# Patient Record
Sex: FEMALE | Race: WHITE | NOT HISPANIC OR LATINO | Employment: PART TIME
[De-identification: names, ages, dates, MRNs, and addresses within clinical notes are randomized per-mention and may not be internally consistent; named-entity substitution may affect disease eponyms.]

---

## 2017-06-10 ENCOUNTER — HEALTH MAINTENANCE LETTER (OUTPATIENT)
Age: 37
End: 2017-06-10

## 2018-10-10 ENCOUNTER — OFFICE VISIT (OUTPATIENT)
Dept: FAMILY MEDICINE | Facility: CLINIC | Age: 38
End: 2018-10-10
Payer: COMMERCIAL

## 2018-10-10 VITALS
HEART RATE: 94 BPM | BODY MASS INDEX: 26.12 KG/M2 | WEIGHT: 153 LBS | TEMPERATURE: 97.6 F | OXYGEN SATURATION: 99 % | DIASTOLIC BLOOD PRESSURE: 60 MMHG | HEIGHT: 64 IN | SYSTOLIC BLOOD PRESSURE: 94 MMHG

## 2018-10-10 DIAGNOSIS — J40 BRONCHITIS: Primary | ICD-10-CM

## 2018-10-10 PROCEDURE — 99213 OFFICE O/P EST LOW 20 MIN: CPT | Performed by: FAMILY MEDICINE

## 2018-10-10 RX ORDER — PREDNISONE 20 MG/1
40 TABLET ORAL DAILY
Qty: 10 TABLET | Refills: 0 | Status: SHIPPED | OUTPATIENT
Start: 2018-10-10 | End: 2019-03-13

## 2018-10-10 NOTE — PROGRESS NOTES
SUBJECTIVE:   Candelario Delatorre is a 37 year old female who presents to clinic today for the following health issues:      Acute Illness   Acute illness concerns: chest congestion   Onset: 3 weeks     Fever: no    Chills/Sweats: YES- at the start    Headache (location?): YES- little    Sinus Pressure:YES- little     Conjunctivitis:  YES- watery    Ear Pain: no    Rhinorrhea: YES- was at the start of her cold     Congestion: YES- chest     Sore Throat: no      Cough: YES-productive of green sputum, with shortness of breath    Wheeze: YES    Decreased Appetite: no    Nausea: no    Vomiting: no    Diarrhea:  no    Dysuria/Freq.: no    Fatigue/Achiness: YES    Sick/Strep Exposure: YES- work     Therapies Tried and outcome: increased water      History as above here for cough and chest congestion ongoing for about a month. Says she has had some sputum production , she endorses some chest congestion. No fevers or chills. She is a smoker but has never been diagnosed with asthma or COPD.     Problem list and histories reviewed & adjusted, as indicated.  Additional history: as documented    There is no problem list on file for this patient.    History reviewed. No pertinent surgical history.    Social History   Substance Use Topics     Smoking status: Current Every Day Smoker     Years: 13.00     Types: Cigarettes     Smokeless tobacco: Never Used      Comment: Smoking History Packs/day: 0.50     Alcohol use Yes      Comment: Alcoholic Drinks/day: rare, not while pregnant     Family History   Problem Relation Age of Onset     HEART DISEASE Paternal Grandfather      Heart Disease     Diabetes Maternal Aunt      Diabetes     Diabetes Maternal Uncle      Diabetes         Current Outpatient Prescriptions   Medication Sig Dispense Refill     predniSONE (DELTASONE) 20 MG tablet Take 2 tablets (40 mg) by mouth daily for 5 days 10 tablet 0     No Known Allergies  BP Readings from Last 3 Encounters:   10/10/18 94/60   09/12/15 112/61  "  06/01/15 112/63    Wt Readings from Last 3 Encounters:   10/10/18 153 lb (69.4 kg)   04/25/12 117 lb 8 oz (53.3 kg)                  Labs reviewed in EPIC    Reviewed and updated as needed this visit by clinical staff  Tobacco  Allergies  Meds  Med Hx  Surg Hx  Fam Hx  Soc Hx      Reviewed and updated as needed this visit by Provider         ROS:  Constitutional, HEENT, cardiovascular, pulmonary, gi and gu systems are negative, except as otherwise noted.    OBJECTIVE:     BP 94/60  Pulse 94  Temp 97.6  F (36.4  C) (Tympanic)  Ht 5' 3.75\" (1.619 m)  Wt 153 lb (69.4 kg)  LMP 10/10/2018  SpO2 99%  BMI 26.47 kg/m2  Body mass index is 26.47 kg/(m^2).  GENERAL: healthy, alert and no distress  EYES: Eyes grossly normal to inspection, PERRL and conjunctivae and sclerae normal  HENT: ear canals and TM's normal, nose and mouth without ulcers or lesions  NECK: no adenopathy, no asymmetry, masses, or scars and thyroid normal to palpation  RESP: rhonchi L upper posterior, L mid anterior and L mid posterior  CV: regular rate and rhythm, normal S1 S2, no S3 or S4, no murmur, click or rub, no peripheral edema and peripheral pulses strong  ABDOMEN: soft, nontender, no hepatosplenomegaly, no masses and bowel sounds normal  MS: no gross musculoskeletal defects noted, no edema    Diagnostic Test Results:  none     ASSESSMENT/PLAN:       (J40) Bronchitis  (primary encounter diagnosis)  Comment: Likely viral asked to call later in the week if no better.   Plan: predniSONE (DELTASONE) 20 MG tablet              FUTURE APPOINTMENTS:       - Follow-up visit as needed  Patient Instructions         Thank you for choosing [facility].  You may be receiving a survey in the mail from [employer] regarding your visit today.  Please take a few minutes to complete and return the survey to let us know how we are doing.      If you have questions or concerns, please contact us via [employer] or you can contact your care team at " [phone].    Our Clinic hours are:  Monday 6:40 am  to 7:00 pm  Tuesday -Friday 6:40 am to 5:00 pm    The [place] outpatient lab hours are:  Monday - Friday 6:10 am to 4:45 pm  Saturdays 7:00 am to 11:00 am  Appointments are required, call [phone]    If you have clinical questions after hours or would like to schedule an appointment,  call the clinic at [phone].  Viral Bronchitis (Adult)    You have a viral bronchitis. Bronchitis is inflammation and swelling of the lining of the lungs. This is often caused by an infection. Symptoms include a dry, hacking cough that is worse at night. The cough may bring up yellow-green mucus. You may also feel short of breath or wheeze. Other symptoms may include tiredness, chest discomfort, and chills.  Bronchitis that is caused by a virus is not treated with antibiotics. Instead, medicines may be given to help relieve symptoms. Symptoms can last up to 2 weeks, although the cough may last much longer.  This illness is contagious during the first few days and is spread through the air by coughing and sneezing, or by direct contact (touching the sick person and then touching your own eyes, nose, or mouth).  Most viral illnesses resolve within 10 to 14 days with rest and simple home remedies, although they may sometimes last for several weeks.  Home care    If symptoms are severe, rest at home for the first 2 to 3 days. When you go back to your usual activities, don't let yourself get too tired.    Do not smoke. Also avoid being exposed to secondhand smoke.    You may use over-the-counter medicine to control fever or pain, unless another pain medicine was prescribed. If you have chronic liver or kidney disease or have ever had a stomach ulcer or gastrointestinal bleeding, talk with your healthcare provider before using these medicines. Also talk to your provider if you are taking medicine to prevent blood clots. Aspirin should never be given to anyone younger than 18  years of age who is ill with a viral infection or fever. It may cause severe liver or brain damage.    Your appetite may be poor, so a light diet is fine. Avoid dehydration by drinking 6 to 8 glasses of fluids per day (such as water, soft drinks, sports drinks, juices, tea, or soup). Extra fluids will help loosen secretions in the nose and lungs.    Over-the-counter cough, cold, and sore-throat medicines will not shorten the length of the illness, but they may help to reduce symptoms. Don't use decongestants if you have high blood pressure.  Follow-up care  Follow up with your healthcare provider, or as advised. If you had an X-ray or ECG (electrocardiogram), a specialist will review it. You will be notified of any new findings that may affect your care.  If you are age 65 or older, or if you have a chronic lung disease or condition that affects your immune system, or you smoke, ask your healthcare provider about getting a pneumococcal vaccine and a yearly flu shot (influenza vaccine).  When to seek medical advice  Call your healthcare provider right away if any of these occur:    Fever of 100.4 F (38 C) or higher, or as directed by your healthcare provider    Coughing up increased amounts of colored sputum    Weakness, drowsiness, headache, facial pain, ear pain, or a stiff neck  Call 911  Call 911 if any of these occur:    Coughing up blood    Worsening weakness, drowsiness, headache, or stiff neck    Trouble breathing, wheezing, or pain with breathing  Date Last Reviewed: 9/13/2015 2000-2017 [employer]. [address] [place] [address]. All rights reserved. This information is not intended as a substitute for professional medical care. Always follow your healthcare professional's instructions.            Trish Stephens MD  [facility]

## 2018-10-10 NOTE — MR AVS SNAPSHOT
After Visit Summary   10/10/2018    Candelario Delatorre    MRN: [id number]           Patient Information     Date Of Birth          1980        Visit Information        Provider Department      10/10/2018 1:40 PM Trish Stephens MD [facility]        Today's Diagnoses     Bronchitis    -  1      Care Instructions          Thank you for choosing [facility].  You may be receiving a survey in the mail from [employer] regarding your visit today.  Please take a few minutes to complete and return the survey to let us know how we are doing.      If you have questions or concerns, please contact us via [email or web address] or you can contact your care team at [phone].    Our Clinic hours are:  Monday 6:40 am  to 7:00 pm  Tuesday -Friday 6:40 am to 5:00 pm    The [place] outpatient lab hours are:  Monday - Friday 6:10 am to 4:45 pm  Saturdays 7:00 am to 11:00 am  Appointments are required, call [phone]    If you have clinical questions after hours or would like to schedule an appointment,  call the clinic at [phone].  Viral Bronchitis (Adult)    You have a viral bronchitis. Bronchitis is inflammation and swelling of the lining of the lungs. This is often caused by an infection. Symptoms include a dry, hacking cough that is worse at night. The cough may bring up yellow-green mucus. You may also feel short of breath or wheeze. Other symptoms may include tiredness, chest discomfort, and chills.  Bronchitis that is caused by a virus is not treated with antibiotics. Instead, medicines may be given to help relieve symptoms. Symptoms can last up to 2 weeks, although the cough may last much longer.  This illness is contagious during the first few days and is spread through the air by coughing and sneezing, or by direct contact (touching the sick person and then touching your own eyes, nose, or mouth).  Most viral illnesses resolve within 10 to 14 days with rest and simple home  remedies, although they may sometimes last for several weeks.  Home care    If symptoms are severe, rest at home for the first 2 to 3 days. When you go back to your usual activities, don't let yourself get too tired.    Do not smoke. Also avoid being exposed to secondhand smoke.    You may use over-the-counter medicine to control fever or pain, unless another pain medicine was prescribed. If you have chronic liver or kidney disease or have ever had a stomach ulcer or gastrointestinal bleeding, talk with your healthcare provider before using these medicines. Also talk to your provider if you are taking medicine to prevent blood clots. Aspirin should never be given to anyone younger than 18 years of age who is ill with a viral infection or fever. It may cause severe liver or brain damage.    Your appetite may be poor, so a light diet is fine. Avoid dehydration by drinking 6 to 8 glasses of fluids per day (such as water, soft drinks, sports drinks, juices, tea, or soup). Extra fluids will help loosen secretions in the nose and lungs.    Over-the-counter cough, cold, and sore-throat medicines will not shorten the length of the illness, but they may help to reduce symptoms. Don't use decongestants if you have high blood pressure.  Follow-up care  Follow up with your healthcare provider, or as advised. If you had an X-ray or ECG (electrocardiogram), a specialist will review it. You will be notified of any new findings that may affect your care.  If you are age 65 or older, or if you have a chronic lung disease or condition that affects your immune system, or you smoke, ask your healthcare provider about getting a pneumococcal vaccine and a yearly flu shot (influenza vaccine).  When to seek medical advice  Call your healthcare provider right away if any of these occur:    Fever of 100.4 F (38 C) or higher, or as directed by your healthcare provider    Coughing up increased amounts of colored sputum    Weakness, drowsiness,  headache, facial pain, ear pain, or a stiff neck  Call 911  Call 911 if any of these occur:    Coughing up blood    Worsening weakness, drowsiness, headache, or stiff neck    Trouble breathing, wheezing, or pain with breathing  Date Last Reviewed: 9/13/2015 2000-2017 [employer]. [address] [place] [address]. All rights reserved. This information is not intended as a substitute for professional medical care. Always follow your healthcare professional's instructions.                Follow-ups after your visit        Follow-up notes from your care team     Return in about 14 days (around 10/24/2018), or if symptoms worsen or fail to improve, for Physical Exam.      Your next 10 appointments already scheduled     Oct 17, 2018  1:00 PM CDT   SHORT with Trish Stephens MD   [facility]    [address] [facility] [address] [address]   [phone]              Who to contact     If you have questions or need follow up information about today's clinic visit or your schedule please contact [facility] directly at [phone].  Normal or non-critical lab and imaging results will be communicated to you by MyChart, letter or phone within 4 business days after the clinic has received the results. If you do not hear from us within 7 days, please contact the clinic through MyChart or phone. If you have a critical or abnormal lab result, we will notify you by phone as soon as possible.  Submit refill requests through [employer] or call your pharmacy and they will forward the refill request to us. Please allow 3 business days for your refill to be completed.          Additional Information About Your Visit        Care EveryWhere ID     This is your Care EveryWhere ID. This could be used by other organizations to access your [place] medical records  [id number]        Your Vitals Were     Pulse Temperature Height Last Period Pulse  "Oximetry BMI (Body Mass Index)    94 97.6  F (36.4  C) (Tympanic) 5' 3.75\" (1.619 m) 10/10/2018 99% 26.47 kg/m2       Blood Pressure from Last 3 Encounters:   10/10/18 94/60   09/12/15 112/61   06/01/15 112/63    Weight from Last 3 Encounters:   10/10/18 153 lb (69.4 kg)   04/25/12 117 lb 8 oz (53.3 kg)              Today, you had the following     No orders found for display         Today's Medication Changes          These changes are accurate as of 10/10/18  1:56 PM.  If you have any questions, ask your nurse or doctor.               Start taking these medicines.        Dose/Directions    predniSONE 20 MG tablet   Commonly known as:  DELTASONE   Used for:  Bronchitis   Started by:  Trish Stephens MD        Dose:  40 mg   Take 2 tablets (40 mg) by mouth daily for 5 days   Quantity:  10 tablet   Refills:  0         Stop taking these medicines if you haven't already. Please contact your care team if you have questions.     HYDROcodone-acetaminophen 5-325 MG per tablet   Commonly known as:  NORCO   Stopped by:  Trish Stephens MD                Where to get your medicines      These medications were sent to [facility] [address] [place] [address] [facility]  [address] [facility] [address]     Phone:  [phone]     predniSONE 20 MG tablet                Primary Care Provider Office Phone # Fax #    Jus García [phone] [address] [facility] [address]        Equal Access to Services     [facility] : Hadii mariano devi hadasho Soomaali, waaxda luqadaha, qaybta kaalmada ademarissa, bria soto. So [facility] [phone].    ATENCIÓN: Si habla español, tiene a tanner disposición servicios gratuitos de asistencia lingüística. Llame al [phone].    We comply with applicable federal civil rights laws and [place] laws. We do not discriminate on the basis of race, color, national origin, age, disability, sex, sexual " orientation, or gender identity.            Thank you!     Thank you for choosing [facility]  for your care. Our goal is always to provide you with excellent care. Hearing back from our patients is one way we can continue to improve our services. Please take a few minutes to complete the written survey that you may receive in the mail after your visit with us. Thank you!             Your Updated Medication List - Protect others around you: Learn how to safely use, store and throw away your medicines at www.disposemymeds.org.          This list is accurate as of 10/10/18  1:56 PM.  Always use your most recent med list.                   Brand Name Dispense Instructions for use Diagnosis    predniSONE 20 MG tablet    DELTASONE    10 tablet    Take 2 tablets (40 mg) by mouth daily for 5 days    Bronchitis

## 2018-10-10 NOTE — PATIENT INSTRUCTIONS
Thank you for choosing [facility].  You may be receiving a survey in the mail from Waleska Garcia regarding your visit today.  Please take a few minutes to complete and return the survey to let us know how we are doing.      If you have questions or concerns, please contact us via [employer] or you can contact your care team at [phone].    Our Clinic hours are:  Monday 6:40 am  to 7:00 pm  Tuesday -Friday 6:40 am to 5:00 pm    The [place] outpatient lab hours are:  Monday - Friday 6:10 am to 4:45 pm  Saturdays 7:00 am to 11:00 am  Appointments are required, call [phone]    If you have clinical questions after hours or would like to schedule an appointment,  call the clinic at [phone].  Viral Bronchitis (Adult)    You have a viral bronchitis. Bronchitis is inflammation and swelling of the lining of the lungs. This is often caused by an infection. Symptoms include a dry, hacking cough that is worse at night. The cough may bring up yellow-green mucus. You may also feel short of breath or wheeze. Other symptoms may include tiredness, chest discomfort, and chills.  Bronchitis that is caused by a virus is not treated with antibiotics. Instead, medicines may be given to help relieve symptoms. Symptoms can last up to 2 weeks, although the cough may last much longer.  This illness is contagious during the first few days and is spread through the air by coughing and sneezing, or by direct contact (touching the sick person and then touching your own eyes, nose, or mouth).  Most viral illnesses resolve within 10 to 14 days with rest and simple home remedies, although they may sometimes last for several weeks.  Home care    If symptoms are severe, rest at home for the first 2 to 3 days. When you go back to your usual activities, don't let yourself get too tired.    Do not smoke. Also avoid being exposed to secondhand smoke.    You may use over-the-counter medicine to control fever or pain, unless another  pain medicine was prescribed. If you have chronic liver or kidney disease or have ever had a stomach ulcer or gastrointestinal bleeding, talk with your healthcare provider before using these medicines. Also talk to your provider if you are taking medicine to prevent blood clots. Aspirin should never be given to anyone younger than 18 years of age who is ill with a viral infection or fever. It may cause severe liver or brain damage.    Your appetite may be poor, so a light diet is fine. Avoid dehydration by drinking 6 to 8 glasses of fluids per day (such as water, soft drinks, sports drinks, juices, tea, or soup). Extra fluids will help loosen secretions in the nose and lungs.    Over-the-counter cough, cold, and sore-throat medicines will not shorten the length of the illness, but they may help to reduce symptoms. Don't use decongestants if you have high blood pressure.  Follow-up care  Follow up with your healthcare provider, or as advised. If you had an X-ray or ECG (electrocardiogram), a specialist will review it. You will be notified of any new findings that may affect your care.  If you are age 65 or older, or if you have a chronic lung disease or condition that affects your immune system, or you smoke, ask your healthcare provider about getting a pneumococcal vaccine and a yearly flu shot (influenza vaccine).  When to seek medical advice  Call your healthcare provider right away if any of these occur:    Fever of 100.4 F (38 C) or higher, or as directed by your healthcare provider    Coughing up increased amounts of colored sputum    Weakness, drowsiness, headache, facial pain, ear pain, or a stiff neck  Call 911  Call 911 if any of these occur:    Coughing up blood    Worsening weakness, drowsiness, headache, or stiff neck    Trouble breathing, wheezing, or pain with breathing  Date Last Reviewed: 9/13/2015 2000-2017 [employer]. [address] [place] [address], [place] [address]. All rights reserved.  This information is not intended as a substitute for professional medical care. Always follow your healthcare professional's instructions.

## 2019-01-31 ENCOUNTER — OFFICE VISIT (OUTPATIENT)
Dept: DERMATOLOGY | Facility: CLINIC | Age: 39
End: 2019-01-31
Payer: COMMERCIAL

## 2019-01-31 VITALS — SYSTOLIC BLOOD PRESSURE: 121 MMHG | OXYGEN SATURATION: 100 % | DIASTOLIC BLOOD PRESSURE: 86 MMHG | HEART RATE: 104 BPM

## 2019-01-31 DIAGNOSIS — L70.0 ACNE VULGARIS: Primary | ICD-10-CM

## 2019-01-31 DIAGNOSIS — Z51.81 THERAPEUTIC DRUG MONITORING: ICD-10-CM

## 2019-01-31 DIAGNOSIS — L72.0 EPIDERMAL CYST: ICD-10-CM

## 2019-01-31 LAB
ANION GAP SERPL CALCULATED.3IONS-SCNC: 7 MMOL/L (ref 3–14)
BUN SERPL-MCNC: 11 MG/DL (ref 7–30)
CALCIUM SERPL-MCNC: 8.6 MG/DL (ref 8.5–10.1)
CHLORIDE SERPL-SCNC: 104 MMOL/L (ref 94–109)
CO2 SERPL-SCNC: 27 MMOL/L (ref 20–32)
CREAT SERPL-MCNC: 0.65 MG/DL (ref 0.52–1.04)
GFR SERPL CREATININE-BSD FRML MDRD: >90 ML/MIN/{1.73_M2}
GLUCOSE SERPL-MCNC: 100 MG/DL (ref 70–99)
POTASSIUM SERPL-SCNC: 4.2 MMOL/L (ref 3.4–5.3)
SODIUM SERPL-SCNC: 138 MMOL/L (ref 133–144)

## 2019-01-31 PROCEDURE — 99214 OFFICE O/P EST MOD 30 MIN: CPT | Performed by: PHYSICIAN ASSISTANT

## 2019-01-31 PROCEDURE — 36415 COLL VENOUS BLD VENIPUNCTURE: CPT | Performed by: PHYSICIAN ASSISTANT

## 2019-01-31 PROCEDURE — 80048 BASIC METABOLIC PNL TOTAL CA: CPT | Performed by: PHYSICIAN ASSISTANT

## 2019-01-31 RX ORDER — TRETINOIN 0.5 MG/G
CREAM TOPICAL
Qty: 45 G | Refills: 4 | Status: SHIPPED | OUTPATIENT
Start: 2019-01-31 | End: 2021-04-21

## 2019-01-31 RX ORDER — SPIRONOLACTONE 100 MG/1
100 TABLET, FILM COATED ORAL DAILY
Qty: 90 TABLET | Refills: 3 | Status: SHIPPED | OUTPATIENT
Start: 2019-01-31 | End: 2019-05-15

## 2019-01-31 NOTE — PROGRESS NOTES
Candelario Delatorre is a 38 year old year old female patient here today for spot on forehead.  Patient states this has been present for 2 years.  Patient reports the following symptoms:  Will occasionally drain and fluctuate in size.  Patient reports the following previous treatments none. She also notes adult female acne, has tried bpo and salicylic acid otc.  Patient reports the following modifying factors none.  Associated symptoms: none.  Patient has no other skin complaints today.  Remainder of the HPI, Meds, PMH, Allergies, FH, and SH was reviewed in chart.    Pertinent Hx:  Acne Vulgaris   History reviewed. No pertinent past medical history.    History reviewed. No pertinent surgical history.     Family History   Problem Relation Age of Onset     Heart Disease Paternal Grandfather         Heart Disease     Diabetes Maternal Aunt         Diabetes     Diabetes Maternal Uncle         Diabetes     Melanoma No family hx of        Social History     Socioeconomic History     Marital status:      Spouse name: Jonathan Guillen     Number of children: Not on file     Years of education: Not on file     Highest education level: Not on file   Social Needs     Financial resource strain: Not on file     Food insecurity - worry: Not on file     Food insecurity - inability: Not on file     Transportation needs - medical: Not on file     Transportation needs - non-medical: Not on file   Occupational History     Not on file   Tobacco Use     Smoking status: Current Every Day Smoker     Years: 13.00     Types: Cigarettes     Smokeless tobacco: Never Used     Tobacco comment: Smoking History Packs/day: 0.50   Substance and Sexual Activity     Alcohol use: Yes     Comment: Alcoholic Drinks/day: rare, not while pregnant     Drug use: Unknown     Types: Other     Comment: Drug use: No     Sexual activity: Yes     Partners: Male   Other Topics Concern     Parent/sibling w/ CABG, MI or angioplasty before 65F 55M? Not Asked   Social  History Narrative    p 2013.       Outpatient Encounter Medications as of 2019   Medication Sig Dispense Refill     spironolactone (ALDACTONE) 100 MG tablet Take 1 tablet (100 mg) by mouth daily 90 tablet 3     tretinoin (RETIN-A) 0.05 % external cream Apply pea sized amount at bedtime to face. 45 g 4     [] predniSONE (DELTASONE) 20 MG tablet Take 2 tablets (40 mg) by mouth daily for 5 days 10 tablet 0     No facility-administered encounter medications on file as of 2019.              Review Of Systems  Skin: As above  Eyes: negative  Ears/Nose/Throat: negative  Respiratory: No shortness of breath, dyspnea on exertion, cough, or hemoptysis  Cardiovascular: negative  Gastrointestinal: negative  Genitourinary: negative  Musculoskeletal: negative  Neurologic: negative  Psychiatric: negative  Hematologic/Lymphatic/Immunologic: negative  Endocrine: negative      O:   NAD, WDWN, Alert & Oriented, Mood & Affect wnl, Vitals stable   Here today alone   /86 (BP Location: Right arm, Patient Position: Sitting, Cuff Size: Adult Large)   Pulse 104   SpO2 100%    General appearance normal   Vitals stable   Alert, oriented and in no acute distress     Small subcutaneous nodule with central punctum on left forehead  Few inflammatory papules on lower half of face and neck     Eyes: Conjunctivae/lids:Normal     ENT: Lips: normal    MSK:Normal    Pulm: Breathing Normal    Neuro/Psych: Orientation:Normal; Mood/Affect:Normal  A/P:  1. Epidermal cyst on left forehead  Discussed excision, will schedule with Dr. Salazar.   2. Acne Vulgaris   Discussed spironolactone with patient, is not sexually active.   Is aware of risk of birth defects, increased potassium, and decreased bp.   Start Spironolactone 100 mg daily.   Check bMP today.   Start tretinoin at bedtime.   Use daily sunscreen and moisturizers.   Recheck in 3 months.

## 2019-01-31 NOTE — LETTER
1/31/2019         RE: Candelario Delatorre  [address]  [place] [address]        Dear Colleague,    Thank you for referring your patient, Candelario Delatorre, to the [facility]. Please see a copy of my visit note below.    Candelario Delatorre is a 38 year old year old female patient here today for spot on forehead.  Patient states this has been present for 2 years.  Patient reports the following symptoms:  Will occasionally drain and fluctuate in size.  Patient reports the following previous treatments none. She also notes adult female acne, has tried bpo and salicylic acid otc.  Patient reports the following modifying factors none.  Associated symptoms: none.  Patient has no other skin complaints today.  Remainder of the HPI, Meds, PMH, Allergies, FH, and SH was reviewed in chart.    Pertinent Hx:  Acne Vulgaris   History reviewed. No pertinent past medical history.    History reviewed. No pertinent surgical history.     Family History   Problem Relation Age of Onset     Heart Disease Paternal Grandfather         Heart Disease     Diabetes Maternal Aunt         Diabetes     Diabetes Maternal Uncle         Diabetes     Melanoma No family hx of        Social History     Socioeconomic History     Marital status:      Spouse name: Jonathan Guillen     Number of children: Not on file     Years of education: Not on file     Highest education level: Not on file   Social Needs     Financial resource strain: Not on file     Food insecurity - worry: Not on file     Food insecurity - inability: Not on file     Transportation needs - medical: Not on file     Transportation needs - non-medical: Not on file   Occupational History     Not on file   Tobacco Use     Smoking status: Current Every Day Smoker     Years: 13.00     Types: Cigarettes     Smokeless tobacco: Never Used     Tobacco comment: Smoking History Packs/day: 0.50   Substance and Sexual Activity     Alcohol use: Yes     Comment:  Alcoholic Drinks/day: rare, not while pregnant     Drug use: Unknown     Types: Other     Comment: Drug use: No     Sexual activity: Yes     Partners: Male   Other Topics Concern     Parent/sibling w/ CABG, MI or angioplasty before 65F 55M? Not Asked   Social History Narrative    p 2013.       Outpatient Encounter Medications as of 2019   Medication Sig Dispense Refill     spironolactone (ALDACTONE) 100 MG tablet Take 1 tablet (100 mg) by mouth daily 90 tablet 3     tretinoin (RETIN-A) 0.05 % external cream Apply pea sized amount at bedtime to face. 45 g 4     [] predniSONE (DELTASONE) 20 MG tablet Take 2 tablets (40 mg) by mouth daily for 5 days 10 tablet 0     No facility-administered encounter medications on file as of 2019.              Review Of Systems  Skin: As above  Eyes: negative  Ears/Nose/Throat: negative  Respiratory: No shortness of breath, dyspnea on exertion, cough, or hemoptysis  Cardiovascular: negative  Gastrointestinal: negative  Genitourinary: negative  Musculoskeletal: negative  Neurologic: negative  Psychiatric: negative  Hematologic/Lymphatic/Immunologic: negative  Endocrine: negative      O:   NAD, WDWN, Alert & Oriented, Mood & Affect wnl, Vitals stable   Here today alone   /86 (BP Location: Right arm, Patient Position: Sitting, Cuff Size: Adult Large)   Pulse 104   SpO2 100%    General appearance normal   Vitals stable   Alert, oriented and in no acute distress     Small subcutaneous nodule with central punctum on left forehead  Few inflammatory papules on lower half of face and neck     Eyes: Conjunctivae/lids:Normal     ENT: Lips: normal    MSK:Normal    Pulm: Breathing Normal    Neuro/Psych: Orientation:Normal; Mood/Affect:Normal  A/P:  1. Epidermal cyst on left forehead  Discussed excision, will schedule with Dr. Salazar.   2. Acne Vulgaris   Discussed spironolactone with patient, is not sexually active.   Is aware of risk of birth defects, increased  potassium, and decreased bp.   Start Spironolactone 100 mg daily.   Check bMP today.   Start tretinoin at bedtime.   Use daily sunscreen and moisturizers.   Recheck in 3 months.       Again, thank you for allowing me to participate in the care of your patient.        Sincerely,        Torri Mcdonald PA-C

## 2019-01-31 NOTE — NURSING NOTE
"Initial /86 (BP Location: Right arm, Patient Position: Sitting, Cuff Size: Adult Large)   Pulse 104   SpO2 100%  Estimated body mass index is 26.47 kg/m  as calculated from the following:    Height as of 10/10/18: 1.619 m (5' 3.75\").    Weight as of 10/10/18: 69.4 kg (153 lb). .      "

## 2019-02-12 ENCOUNTER — TELEPHONE (OUTPATIENT)
Dept: FAMILY MEDICINE | Facility: CLINIC | Age: 39
End: 2019-02-12

## 2019-02-12 NOTE — TELEPHONE ENCOUNTER
Panel Management Review      Patient has the following on her problem list:       Composite cancer screening  Chart review shows that this patient is due/due soon for the following Pap Smear  Summary:    Patient is due/failing the following:   PAP    Action needed:   Patient needs office visit for pap.    Type of outreach:    Phone, spoke to patient.  Patient had other appt but will get around to making appt for pap    Questions for provider review:    None                                                                                                                                    Stephany Lorenz CMA     Chart routed to  .

## 2019-03-13 ENCOUNTER — OFFICE VISIT (OUTPATIENT)
Dept: DERMATOLOGY | Facility: CLINIC | Age: 39
End: 2019-03-13
Payer: COMMERCIAL

## 2019-03-13 VITALS — OXYGEN SATURATION: 98 % | HEART RATE: 86 BPM | SYSTOLIC BLOOD PRESSURE: 108 MMHG | DIASTOLIC BLOOD PRESSURE: 72 MMHG

## 2019-03-13 DIAGNOSIS — L72.0 EPIDERMAL CYST: Primary | ICD-10-CM

## 2019-03-13 PROCEDURE — 11442 EXC FACE-MM B9+MARG 1.1-2 CM: CPT | Mod: 51 | Performed by: DERMATOLOGY

## 2019-03-13 PROCEDURE — 88331 PATH CONSLTJ SURG 1 BLK 1SPC: CPT | Performed by: DERMATOLOGY

## 2019-03-13 PROCEDURE — 13131 CMPLX RPR F/C/C/M/N/AX/G/H/F: CPT | Performed by: DERMATOLOGY

## 2019-03-13 NOTE — PATIENT INSTRUCTIONS
Sutured Wound Care     [facility]: [phone]    [facility]: [phone]    forehead      ? No strenuous activity for 48 hours. Resume moderate activity in 48 hours. No heavy exercising until you are seen for follow up in one week.     ? Take Tylenol as needed for discomfort.                         ? Do not drink alcoholic beverages for 48 hours.     ? Keep the pressure bandage in place for 24 hours. If the bandage becomes blood tinged or loose, reinforce it with gauze and tape.        (Refer to the reverse side of this page for management of bleeding).    ? Remove pressure bandage in 24 hours     ? Leave the flat bandage in place until your follow up appointment.    ? Keep the bandage dry. Wash around it carefully.    ? If the tape becomes soiled or starts to come off, reinforce it with additional paper tape.    ? Do not smoke for 3 weeks; smoking is detrimental to wound healing.    ? It is normal to have swelling and bruising around the surgical site. The bruising will fade in approximately 10-14 days. Elevate the area to reduce swelling.    ? Numbness, itchiness and sensitivity to temperature changes can occur after surgery and may take up to 18 months to normalize.      POSSIBLE COMPLICATIONS    BLEEDIN. Leave the bandage in place.  2. Use tightly rolled up gauze or a cloth to apply direct pressure over the bandage for 20   minutes.  3. Reapply pressure for an additional 20 minutes if necessary  4. Call the office or go to the nearest emergency room if pressure fails to stop the bleeding.  5. Use additional gauze and tape to maintain pressure once the bleeding has stopped.        PAIN:    1. Post operative pain should slowly get better, never worse.  2. A severe increase in pain may indicate a problem. Call the office if this occurs.    In case of emergency phone:Dr Salazar [phone]

## 2019-03-13 NOTE — PROGRESS NOTES
Candelario Delatorre is a 38 year old year old female patient here today for evaluation and managment of tender draining cyst on forehead. Patient reports the following modifying factors none.  Associated symptoms: none.  Patient has no other skin complaints today.  Remainder of the HPI, Meds, PMH, Allergies, FH, and SH was reviewed in chart.    History reviewed. No pertinent past medical history.    History reviewed. No pertinent surgical history.     Family History   Problem Relation Age of Onset     Heart Disease Paternal Grandfather         Heart Disease     Diabetes Maternal Aunt         Diabetes     Diabetes Maternal Uncle         Diabetes     Melanoma No family hx of        Social History     Socioeconomic History     Marital status:      Spouse name: Jonathan Guillen     Number of children: Not on file     Years of education: Not on file     Highest education level: Not on file   Occupational History     Not on file   Social Needs     Financial resource strain: Not on file     Food insecurity:     Worry: Not on file     Inability: Not on file     Transportation needs:     Medical: Not on file     Non-medical: Not on file   Tobacco Use     Smoking status: Current Every Day Smoker     Years: 13.00     Types: Cigarettes     Smokeless tobacco: Never Used     Tobacco comment: Smoking History Packs/day: 0.50   Substance and Sexual Activity     Alcohol use: Yes     Comment: Alcoholic Drinks/day: rare, not while pregnant     Drug use: Unknown     Types: Other     Comment: Drug use: No     Sexual activity: Yes     Partners: Male   Lifestyle     Physical activity:     Days per week: Not on file     Minutes per session: Not on file     Stress: Not on file   Relationships     Social connections:     Talks on phone: Not on file     Gets together: Not on file     Attends Pent[employer]al service: Not on file     Active member of club or organization: Not on file     Attends meetings of clubs or organizations: Not on file      Relationship status: Not on file     Intimate partner violence:     Fear of current or ex partner: Not on file     Emotionally abused: Not on file     Physically abused: Not on file     Forced sexual activity: Not on file   Other Topics Concern     Parent/sibling w/ CABG, MI or angioplasty before 65F 55M? Not Asked   Social History Narrative    p 7/18/2013.       Outpatient Encounter Medications as of 3/13/2019   Medication Sig Dispense Refill     spironolactone (ALDACTONE) 100 MG tablet Take 1 tablet (100 mg) by mouth daily 90 tablet 3     tretinoin (RETIN-A) 0.05 % external cream Apply pea sized amount at bedtime to face. 45 g 4     [DISCONTINUED] predniSONE (DELTASONE) 20 MG tablet Take 2 tablets (40 mg) by mouth daily for 5 days 10 tablet 0     No facility-administered encounter medications on file as of 3/13/2019.              Review Of Systems  Skin: As above  Eyes: negative  Ears/Nose/Throat: negative  Respiratory: No shortness of breath, dyspnea on exertion, cough, or hemoptysis  Cardiovascular: negative  Gastrointestinal: negative  Genitourinary: negative  Musculoskeletal: negative  Neurologic: negative  Psychiatric: negative  Hematologic/Lymphatic/Immunologic: negative  Endocrine: negative      O:   NAD, WDWN, Alert & Oriented, Mood & Affect wnl, Vitals stable   Here today alone   /72   Pulse 86   SpO2 98%    General appearance normal   Vitals stable   Alert, oriented and in no acute distress     L sup forehead 1.3cm nodule with comedone  Eyes: Conjunctivae/lids:Normal     ENT: Lips, buccal mucosa, tongue: normal    MSK:Normal    Cardiovascular: peripheral edema none    Pulm: Breathing Normal    Neuro/Psych: Orientation:Normal; Mood/Affect:Normal      MICRO:   L sup forehead: Normal epidermis with cyst lined by stratified squamous epithelium with epidermal keratinization   A/P:  1. L sup forehead 1.3cm eic  BENIGN LESIONS DISCUSSED WITH PATIENT:  I discussed the specifics of tumor, prognosis, and  genetics of benign lesions.  I explained that treatment of these lesions would be purely cosmetic and not medically neccessary.  I discussed with patient different removal options including excision, cautery and /or laser.    EXCISION OF CYST AND COMPLEX: [facility], consent obtained, anesthesia and prep, the margins of the cyst were identified and an elliptical incision was made encompassing the cyst. The incisions were made through the skin and down to and including the subcutaneous tissue. The cyst was removed en bloc and submitted for frozen pathologic review. The wound edges were widely undermined until adequate tissue mobility was obtained. hemostasis was achieved. The wound edges were then closed in a layered fashion, being careful not to leave any dead space. Postoperative length was 2 cm.   EBL minimal; complications none; wound care routine. The patient was discharged in good condition and will return in one week for wound evaluation.

## 2019-03-13 NOTE — NURSING NOTE
Chief Complaint   Patient presents with     Derm Problem     cyst removal       Vitals:    03/13/19 0843   BP: 108/72   Pulse: 86   SpO2: 98%     Wt Readings from Last 1 Encounters:   10/10/18 69.4 kg (153 lb)       Rebecca Montano LPN.................3/13/2019

## 2019-03-13 NOTE — LETTER
3/13/2019         RE: Candelario Delatorre  [address]  [place] [address]        Dear Colleague,    Thank you for referring your patient, Candelario Delatorre, to the [facility]. Please see a copy of my visit note below.    Candelario Delatorre is a 38 year old year old female patient here today for evaluation and managment of tender draining cyst on forehead. Patient reports the following modifying factors none.  Associated symptoms: none.  Patient has no other skin complaints today.  Remainder of the HPI, Meds, PMH, Allergies, FH, and SH was reviewed in chart.    History reviewed. No pertinent past medical history.    History reviewed. No pertinent surgical history.     Family History   Problem Relation Age of Onset     Heart Disease Paternal Grandfather         Heart Disease     Diabetes Maternal Aunt         Diabetes     Diabetes Maternal Uncle         Diabetes     Melanoma No family hx of        Social History     Socioeconomic History     Marital status:      Spouse name: Jonathan Guillen     Number of children: Not on file     Years of education: Not on file     Highest education level: Not on file   Occupational History     Not on file   Social Needs     Financial resource strain: Not on file     Food insecurity:     Worry: Not on file     Inability: Not on file     Transportation needs:     Medical: Not on file     Non-medical: Not on file   Tobacco Use     Smoking status: Current Every Day Smoker     Years: 13.00     Types: Cigarettes     Smokeless tobacco: Never Used     Tobacco comment: Smoking History Packs/day: 0.50   Substance and Sexual Activity     Alcohol use: Yes     Comment: Alcoholic Drinks/day: rare, not while pregnant     Drug use: Unknown     Types: Other     Comment: Drug use: No     Sexual activity: Yes     Partners: Male   Lifestyle     Physical activity:     Days per week: Not on file     Minutes per session: Not on file     Stress: Not on file    Relationships     Social connections:     Talks on phone: Not on file     Gets together: Not on file     Attends Jainism service: Not on file     Active member of club or organization: Not on file     Attends meetings of clubs or organizations: Not on file     Relationship status: Not on file     Intimate partner violence:     Fear of current or ex partner: Not on file     Emotionally abused: Not on file     Physically abused: Not on file     Forced sexual activity: Not on file   Other Topics Concern     Parent/sibling w/ CABG, MI or angioplasty before 65F 55M? Not Asked   Social History Narrative    p 7/18/2013.       Outpatient Encounter Medications as of 3/13/2019   Medication Sig Dispense Refill     spironolactone (ALDACTONE) 100 MG tablet Take 1 tablet (100 mg) by mouth daily 90 tablet 3     tretinoin (RETIN-A) 0.05 % external cream Apply pea sized amount at bedtime to face. 45 g 4     [DISCONTINUED] predniSONE (DELTASONE) 20 MG tablet Take 2 tablets (40 mg) by mouth daily for 5 days 10 tablet 0     No facility-administered encounter medications on file as of 3/13/2019.              Review Of Systems  Skin: As above  Eyes: negative  Ears/Nose/Throat: negative  Respiratory: No shortness of breath, dyspnea on exertion, cough, or hemoptysis  Cardiovascular: negative  Gastrointestinal: negative  Genitourinary: negative  Musculoskeletal: negative  Neurologic: negative  Psychiatric: negative  Hematologic/Lymphatic/Immunologic: negative  Endocrine: negative      O:   NAD, WDWN, Alert & Oriented, Mood & Affect wnl, Vitals stable   Here today alone   /72   Pulse 86   SpO2 98%    General appearance normal   Vitals stable   Alert, oriented and in no acute distress     L sup forehead 1.3cm nodule with comedone  Eyes: Conjunctivae/lids:Normal     ENT: Lips, buccal mucosa, tongue: normal    MSK:Normal    Cardiovascular: peripheral edema none    Pulm: Breathing Normal    Neuro/Psych: Orientation:Normal;  Mood/Affect:Normal      MICRO:   L sup forehead: Normal epidermis with cyst lined by stratified squamous epithelium with epidermal keratinization   A/P:  1. L sup forehead 1.3cm eic  BENIGN LESIONS DISCUSSED WITH PATIENT:  I discussed the specifics of tumor, prognosis, and genetics of benign lesions.  I explained that treatment of these lesions would be purely cosmetic and not medically neccessary.  I discussed with patient different removal options including excision, cautery and /or laser.    EXCISION OF CYST AND COMPLEX: After thorough discussion of [facility], consent obtained, anesthesia and prep, the margins of the cyst were identified and an elliptical incision was made encompassing the cyst. The incisions were made through the skin and down to and including the subcutaneous tissue. The cyst was removed en bloc and submitted for frozen pathologic review. The wound edges were widely undermined until adequate tissue mobility was obtained. hemostasis was achieved. The wound edges were then closed in a layered fashion, being careful not to leave any dead space. Postoperative length was 2 cm.   EBL minimal; complications none; wound care routine. The patient was discharged in good condition and will return in one week for wound evaluation.      Again, thank you for allowing me to participate in the care of your patient.        Sincerely,        Uche Salazar MD

## 2019-03-18 ENCOUNTER — TELEPHONE (OUTPATIENT)
Dept: DERMATOLOGY | Facility: CLINIC | Age: 39
End: 2019-03-18

## 2019-03-18 NOTE — TELEPHONE ENCOUNTER
Reason for Call:  Other     Detailed comments: Pt had lesion removed from forehead on 03/13 - Would like to know results of pathology.     Phone Number Patient can be reached at: Home number on file [phone] (home)    Best Time: Any    Can we leave a detailed message on this number? YES    Call taken on 3/18/2019 at 11:46 AM by Denise Behrendt

## 2019-03-18 NOTE — TELEPHONE ENCOUNTER
Epidermal cyst                                  I don't see a send out biopsy in chart, looks like path was sent out per dictation though?...    Please advise. Teresa Javed RN

## 2019-03-18 NOTE — TELEPHONE ENCOUNTER
Message left to return call. Per note addended by Dr. Salazar below, diagnosis is Epidermal cyst.     Teresa Javed RN

## 2019-03-20 ENCOUNTER — ALLIED HEALTH/NURSE VISIT (OUTPATIENT)
Dept: DERMATOLOGY | Facility: CLINIC | Age: 39
End: 2019-03-20
Payer: COMMERCIAL

## 2019-03-20 DIAGNOSIS — Z48.01 CHANGE OR REMOVAL OF SURGICAL WOUND DRESSING: Primary | ICD-10-CM

## 2019-03-20 PROCEDURE — 99207 ZZC NO CHARGE NURSE ONLY: CPT

## 2019-03-20 NOTE — PATIENT INSTRUCTIONS

## 2019-03-20 NOTE — PROGRESS NOTES
Patient returned to clinic for post surgery 1 week follow up bandage change to forehead. Patient has no complaints, denies pain. Bandage removed, area cleansed with normal saline. Site is healing and wound edges approximating well. Reapplied new steri strips and paper tape. Advised to watch for signs/symptoms of infection; spreading redness,drainage, odor, fever. Call or report promptly to clinic. Patient given written instructions and informed to return to clinic as needed. Patient verbalized understanding.     Checo VELA RN   Specialty Clinics

## 2019-04-04 ENCOUNTER — OFFICE VISIT (OUTPATIENT)
Dept: FAMILY MEDICINE | Facility: CLINIC | Age: 39
End: 2019-04-04
Payer: COMMERCIAL

## 2019-04-04 VITALS
RESPIRATION RATE: 14 BRPM | HEART RATE: 80 BPM | SYSTOLIC BLOOD PRESSURE: 110 MMHG | BODY MASS INDEX: 28.53 KG/M2 | OXYGEN SATURATION: 98 % | HEIGHT: 63 IN | TEMPERATURE: 98.6 F | DIASTOLIC BLOOD PRESSURE: 62 MMHG | WEIGHT: 161 LBS

## 2019-04-04 DIAGNOSIS — Z20.2 EXPOSURE TO STD: ICD-10-CM

## 2019-04-04 DIAGNOSIS — Z11.3 SCREENING FOR STD (SEXUALLY TRANSMITTED DISEASE): ICD-10-CM

## 2019-04-04 DIAGNOSIS — Z00.00 ENCOUNTER FOR ROUTINE ADULT HEALTH EXAMINATION WITHOUT ABNORMAL FINDINGS: Primary | ICD-10-CM

## 2019-04-04 LAB
HBV SURFACE AB SERPL IA-ACNC: 0.34 M[IU]/ML
HIV 1+2 AB+HIV1 P24 AG SERPL QL IA: NONREACTIVE

## 2019-04-04 PROCEDURE — G0476 HPV COMBO ASSAY CA SCREEN: HCPCS | Performed by: FAMILY MEDICINE

## 2019-04-04 PROCEDURE — G0145 SCR C/V CYTO,THINLAYER,RESCR: HCPCS | Performed by: FAMILY MEDICINE

## 2019-04-04 PROCEDURE — 86706 HEP B SURFACE ANTIBODY: CPT | Performed by: FAMILY MEDICINE

## 2019-04-04 PROCEDURE — 99213 OFFICE O/P EST LOW 20 MIN: CPT | Mod: 25 | Performed by: FAMILY MEDICINE

## 2019-04-04 PROCEDURE — 36415 COLL VENOUS BLD VENIPUNCTURE: CPT | Performed by: FAMILY MEDICINE

## 2019-04-04 PROCEDURE — 87591 N.GONORRHOEAE DNA AMP PROB: CPT | Performed by: FAMILY MEDICINE

## 2019-04-04 PROCEDURE — 87389 HIV-1 AG W/HIV-1&-2 AB AG IA: CPT | Performed by: FAMILY MEDICINE

## 2019-04-04 PROCEDURE — 99395 PREV VISIT EST AGE 18-39: CPT | Performed by: FAMILY MEDICINE

## 2019-04-04 PROCEDURE — 0064U ANTB TP TOTAL&RPR IA QUAL: CPT | Performed by: FAMILY MEDICINE

## 2019-04-04 PROCEDURE — 87491 CHLMYD TRACH DNA AMP PROBE: CPT | Performed by: FAMILY MEDICINE

## 2019-04-04 ASSESSMENT — MIFFLIN-ST. JEOR: SCORE: 1376.48

## 2019-04-04 NOTE — PROGRESS NOTES
SUBJECTIVE:   CC: Candelario Delatorre is an 38 year old woman who presents for preventive health visit.       Patient is a 38-year-old female comes in today for annual physical.  She reports that her partner was recently  diagnosed with herpes and she would like to have a blood work to screen for this.  Explained to the patient that the herpes blood tests usually brings up false positive and is not encouraged to check herpes screening in asymptomatic patient.     Patient was very distressed during the encounter and there was a lot  of swearing and cursing with  crying all through the visit.  I told her we will consult with ID about and I showed her literature showing that screening asymptomatic patient that  had an exposure to herpes is not recommended.  She will like other blood test done to screen for the other STD tests this was ordered for her.    Healthy Habits:    Do you get at least three servings of calcium containing foods daily (dairy, green leafy vegetables, etc.)? yes    Amount of exercise or daily activities, outside of work: 5 day(s) per week    Problems taking medications regularly No    Medication side effects: No    Have you had an eye exam in the past two years? yes    Do you see a dentist twice per year? yes    Do you have sleep apnea, excessive snoring or daytime drowsiness?no      Patient would like to be tested for herpes, partner has a positive herpes test  and all STD     Today's PHQ-2 Score:   PHQ-2 ( 1999 Pfizer) 4/4/2019 10/10/2018   Q1: Little interest or pleasure in doing things 0 0   Q2: Feeling down, depressed or hopeless 0 0   PHQ-2 Score 0 0       Abuse: Current or Past(Physical, Sexual or Emotional)- No  Do you feel safe in your environment? Yes    Social History     Tobacco Use     Smoking status: Current Every Day Smoker     Years: 13.00     Types: Cigarettes     Smokeless tobacco: Never Used     Tobacco comment: Smoking History Packs/day: 0.50   Substance Use Topics     Alcohol use:  Yes     Comment: Alcoholic Drinks/day: rare, not while pregnant     If you drink alcohol do you typically have >3 drinks per day or >7 drinks per week? No                     Reviewed orders with patient.  Reviewed health maintenance and updated orders accordingly - Yes  Labs reviewed in EPIC  BP Readings from Last 3 Encounters:   04/04/19 110/62   03/13/19 108/72   01/31/19 121/86    Wt Readings from Last 3 Encounters:   04/04/19 73 kg (161 lb)   10/10/18 69.4 kg (153 lb)   04/25/12 53.3 kg (117 lb 8 oz)                  There is no problem list on file for this patient.    History reviewed. No pertinent surgical history.    Social History     Tobacco Use     Smoking status: Current Every Day Smoker     Years: 13.00     Types: Cigarettes     Smokeless tobacco: Never Used     Tobacco comment: Smoking History Packs/day: 0.50   Substance Use Topics     Alcohol use: Yes     Comment: Alcoholic Drinks/day: rare, not while pregnant     Family History   Problem Relation Age of Onset     Heart Disease Paternal Grandfather         Heart Disease     Diabetes Maternal Aunt         Diabetes     Diabetes Maternal Uncle         Diabetes     Melanoma No family hx of          Current Outpatient Medications   Medication Sig Dispense Refill     spironolactone (ALDACTONE) 100 MG tablet Take 1 tablet (100 mg) by mouth daily 90 tablet 3     tretinoin (RETIN-A) 0.05 % external cream Apply pea sized amount at bedtime to face. 45 g 4     No Known Allergies    Mammogram not appropriate for this patient based on age.    Pertinent mammograms are reviewed under the imaging tab.  History of abnormal Pap smear: NO - age 30- 65 PAP every 3 years recommended  PAP / HPV 4/25/2012   PAP NIL     Reviewed and updated as needed this visit by clinical staff  Tobacco  Allergies  Meds  Med Hx  Surg Hx  Fam Hx  Soc Hx        Reviewed and updated as needed this visit by Provider        History reviewed. No pertinent past medical history.   History  "reviewed. No pertinent surgical history.    ROS:  CONSTITUTIONAL: NEGATIVE for fever, chills, change in weight  INTEGUMENTARY/SKIN: NEGATIVE for worrisome rashes, moles or lesions  EYES: NEGATIVE for vision changes or irritation  ENT: NEGATIVE for ear, mouth and throat problems  RESP: NEGATIVE for significant cough or SOB  BREAST: NEGATIVE for masses, tenderness or discharge  CV: NEGATIVE for chest pain, palpitations or peripheral edema  GI: NEGATIVE for nausea, abdominal pain, heartburn, or change in bowel habits  : NEGATIVE for unusual urinary or vaginal symptoms. No vaginal bleeding.  MUSCULOSKELETAL: NEGATIVE for significant arthralgias or myalgia  NEURO: NEGATIVE for weakness, dizziness or paresthesias  PSYCHIATRIC: NEGATIVE for changes in mood or affect     OBJECTIVE:   /62   Pulse 80   Temp 98.6  F (37  C) (Tympanic)   Resp 14   Ht 1.588 m (5' 2.5\")   Wt 73 kg (161 lb)   LMP 03/04/2019 (Approximate)   SpO2 98%   BMI 28.98 kg/m    EXAM:  GENERAL: healthy, alert and no distress  EYES: Eyes grossly normal to inspection, PERRL and conjunctivae and sclerae normal  HENT: ear canals and TM's normal, nose and mouth without ulcers or lesions  NECK: no adenopathy, no asymmetry, masses, or scars and thyroid normal to palpation  RESP: lungs clear to auscultation - no rales, rhonchi or wheezes  BREAST: normal without masses, tenderness or nipple discharge and no palpable axillary masses or adenopathy  CV: regular rate and rhythm, normal S1 S2, no S3 or S4, no murmur, click or rub, no peripheral edema and peripheral pulses strong  ABDOMEN: soft, nontender, no hepatosplenomegaly, no masses and bowel sounds normal   (female): normal female external genitalia, normal urethral meatus, vaginal mucosa pink, moist, well rugated, and normal cervix/adnexa/uterus without masses or discharge.pap obtained ,std check done   MS: no gross musculoskeletal defects noted, no edema  SKIN: no suspicious lesions or " "rashes  PSYCH: mentation appears normal, affect normal/bright    Diagnostic Test Results:  No results found for this or any previous visit (from the past 24 hour(s)).    ASSESSMENT/PLAN:   (Z00.00) Encounter for routine adult health examination without abnormal findings  (primary encounter diagnosis)  Comment: Patient will be notified of results  Plan: Lipid panel reflex to direct LDL Fasting, Pap         imaged thin layer screen with HPV - recommended        age 30 - 65 years (select HPV order below),         Glucose            (Z20.2) Exposure to STD  Comment: Patient will be notified of results  Plan: HIV Antigen Antibody Combo, Treponema Abs w         Reflex to RPR and Titer, Hepatitis B Surface         Antibody, CANCELED: Herpes: HSV IgM antibody            (Z11.3) Screening for STD (sexually transmitted disease)  Comment: .Patient will be notified of results  Plan: NEISSERIA GONORRHOEA PCR, CHLAMYDIA TRACHOMATIS        PCR        COUNSELING:   Reviewed preventive health counseling, as reflected in patient instructions       Regular exercise       Healthy diet/nutrition       Vision screening    BP Readings from Last 1 Encounters:   04/04/19 110/62     Estimated body mass index is 28.98 kg/m  as calculated from the following:    Height as of this encounter: 1.588 m (5' 2.5\").    Weight as of this encounter: 73 kg (161 lb).      Weight management plan: Discussed healthy diet and exercise guidelines     reports that she has been smoking cigarettes.  She has smoked for the past 13.00 years. she has never used smokeless tobacco.  Tobacco Cessation Action Plan: Information offered: Patient not interested at this time    Counseling Resources:  ATP IV Guidelines  Pooled Cohorts Equation Calculator  Breast Cancer Risk Calculator  FRAX Risk Assessment  ICSI Preventive Guidelines  Dietary Guidelines for Americans, 2010  USDA's MyPlate  ASA Prophylaxis  Lung CA Screening    Trish Stephens MD  [facility] " [place] [facility]

## 2019-04-04 NOTE — LETTER
April 11, 2019    Candelario Delatorre  [address]   [place] [address]    Dear ,  This letter is regarding your recent Pap smear (cervical cancer screening) and Human Papillomavirus (HPV) test.  We are happy to inform you that your Pap smear result is normal. Cervical cancer is closely linked with certain types of HPV. Your results showed no evidence of high-risk HPV.  Therefore we recommend you return in 3 years for your next pap smear.  You will still need to return to the clinic every year for an annual exam and other preventive tests.  If you have additional questions regarding this result, please call our registered nurse, Nasrin at [phone].  Sincerely,    Trish Stephens MD/[facility]

## 2019-04-05 LAB
C TRACH DNA SPEC QL NAA+PROBE: NEGATIVE
N GONORRHOEA DNA SPEC QL NAA+PROBE: NEGATIVE
SPECIMEN SOURCE: NORMAL
SPECIMEN SOURCE: NORMAL
T PALLIDUM AB SER QL: NONREACTIVE

## 2019-04-08 LAB
COPATH REPORT: NORMAL
PAP: NORMAL

## 2019-04-08 NOTE — RESULT ENCOUNTER NOTE
Please inform patient that test result was within normal parameters.   Thank you.     Trish Stephens M.D.

## 2019-04-09 LAB
FINAL DIAGNOSIS: NORMAL
HPV HR 12 DNA CVX QL NAA+PROBE: NEGATIVE
HPV16 DNA SPEC QL NAA+PROBE: NEGATIVE
HPV18 DNA SPEC QL NAA+PROBE: NEGATIVE
SPECIMEN DESCRIPTION: NORMAL
SPECIMEN SOURCE CVX/VAG CYTO: NORMAL

## 2019-04-15 ENCOUNTER — OFFICE VISIT (OUTPATIENT)
Dept: FAMILY MEDICINE | Facility: CLINIC | Age: 39
End: 2019-04-15
Payer: COMMERCIAL

## 2019-04-15 VITALS
TEMPERATURE: 97.7 F | BODY MASS INDEX: 29.06 KG/M2 | DIASTOLIC BLOOD PRESSURE: 75 MMHG | HEART RATE: 100 BPM | HEIGHT: 63 IN | SYSTOLIC BLOOD PRESSURE: 126 MMHG | WEIGHT: 164 LBS

## 2019-04-15 DIAGNOSIS — N89.8 VAGINAL IRRITATION: ICD-10-CM

## 2019-04-15 DIAGNOSIS — N76.0 BV (BACTERIAL VAGINOSIS): ICD-10-CM

## 2019-04-15 DIAGNOSIS — Z20.828 EXPOSURE TO HERPES SIMPLEX VIRUS (HSV): Primary | ICD-10-CM

## 2019-04-15 DIAGNOSIS — N89.8 VAGINAL LESION: ICD-10-CM

## 2019-04-15 DIAGNOSIS — B96.89 BV (BACTERIAL VAGINOSIS): ICD-10-CM

## 2019-04-15 PROBLEM — F41.9 ANXIETY: Status: ACTIVE | Noted: 2017-05-31

## 2019-04-15 LAB
SPECIMEN SOURCE: ABNORMAL
WET PREP SPEC: ABNORMAL

## 2019-04-15 PROCEDURE — 87210 SMEAR WET MOUNT SALINE/INK: CPT | Performed by: PHYSICIAN ASSISTANT

## 2019-04-15 PROCEDURE — 99214 OFFICE O/P EST MOD 30 MIN: CPT | Performed by: PHYSICIAN ASSISTANT

## 2019-04-15 PROCEDURE — 87529 HSV DNA AMP PROBE: CPT | Mod: 59 | Performed by: PHYSICIAN ASSISTANT

## 2019-04-15 PROCEDURE — 87529 HSV DNA AMP PROBE: CPT | Performed by: PHYSICIAN ASSISTANT

## 2019-04-15 RX ORDER — CLINDAMYCIN PHOSPHATE 20 MG/G
1 CREAM VAGINAL AT BEDTIME
Qty: 40 G | Refills: 0 | Status: SHIPPED | OUTPATIENT
Start: 2019-04-15 | End: 2019-05-15

## 2019-04-15 ASSESSMENT — MIFFLIN-ST. JEOR: SCORE: 1390.09

## 2019-04-15 NOTE — PROGRESS NOTES
"  SUBJECTIVE:   Candelario Delatorre is a 38 year old female who presents to clinic today for the following   health issues:    Chief Complaint   Patient presents with     STD     testing, know exposure to herpies     Patient is here in clinic with concern about a skin lesion she has developed on her left labia area. She was exposed to herpes by her boyfriend and has joslyn very anxious about that since then. She has also had some whitish vaginal discharge and overall vaginal irritation. She would like the lesion and discharge evaluated.   -------------------------------------    Additional history: as documented    Reviewed  and updated as needed this visit by clinical staff  Tobacco  Allergies  Meds  Problems  Med Hx  Surg Hx  Fam Hx  Soc Hx          Reviewed and updated as needed this visit by Provider  Meds  Problems         BP Readings from Last 3 Encounters:   04/15/19 126/75   04/04/19 110/62   03/13/19 108/72    Wt Readings from Last 3 Encounters:   04/15/19 74.4 kg (164 lb)   04/04/19 73 kg (161 lb)   10/10/18 69.4 kg (153 lb)                    ROS:  Constitutional, HEENT, cardiovascular, pulmonary, gi and gu systems are negative, except as otherwise noted.    OBJECTIVE:     /75   Pulse 100   Temp 97.7  F (36.5  C) (Tympanic)   Ht 1.588 m (5' 2.5\")   Wt 74.4 kg (164 lb)   BMI 29.52 kg/m    Body mass index is 29.52 kg/m .  GENERAL: healthy and alert   (female): normal urethral meatus , vaginal discharge - scant and a small raised lesion on the left labial area with a white head, no other lesions noted.  MS: no gross musculoskeletal defects noted, no edema    Diagnostic Test Results:  Results for orders placed or performed in visit on 04/15/19 (from the past 24 hour(s))   Wet prep   Result Value Ref Range    Specimen Description Vagina     Wet Prep No Trichomonas seen     Wet Prep No yeast seen     Wet Prep No WBC's seen     Wet Prep Few  Clue cells seen   (A)      HSV swab- " pending    ASSESSMENT/PLAN:       ICD-10-CM    1. Exposure to herpes simplex virus (HSV) Z20.828 HSV 1 and 2 DNA by PCR   2. Vaginal lesion N89.8 HSV 1 and 2 DNA by PCR   3. Vaginal irritation N89.8 Wet prep   4. BV (bacterial vaginosis) N76.0 clindamycin (CLEOCIN) 2 % vaginal cream    B96.89        I will treat for BV and will follow up on HSV swab and treat if indicated. I did spend time discussing HSV and management and spread risks. Patient will follow up if any other questions or concerns.   See Patient Instructions    Angelina Quintanilla PA-C  [facility]

## 2019-04-15 NOTE — PATIENT INSTRUCTIONS
I will treat for bacterial infection and contact you with results of HSV swab.     Patient Education     Vaginal Infection: Bacterial Vaginosis  Both good and bad bacteria are present in a healthy vagina. Bacterial vaginosis (BV) occurs when these bacteria get out of balance. The numbers of good bacteria decrease. This allows the numbers of bad bacteria to increase and cause BV. In most cases, BV is not a serious problem.  Causes of bacterial vaginosis  The cause of BV is not clear. Douching may lead to it. Having sex with a new partner or more than 1 partner makes it more likely.  Symptoms of bacterial vaginosis  Symptoms of BV vary for each woman. Some women have few symptoms or none at all. If symptoms are present, they can include:    Thin, milky white or gray or sometimes green discharge    Unpleasant  fishy  odor    Irritation, itching, and burning at opening of vagina which may indicate mixed vaginitis      Burning or irritation with sex or urination which may indicate mixed vaginitis  Diagnosing bacterial vaginosis  Your healthcare provider will ask about your symptoms and health history. He or she will also do a pelvic exam. This is an exam of your vagina and cervix. A sample of vaginal fluid or discharge may be taken. This sample is checked for signs of BV.  Treating bacterial vaginosis  BV is often treated with antibiotics. They may be given in oral pill form or as a vaginal cream. To use these medicines:    Be sure to take all of your medicine, even if your symptoms go away.    If you re taking antibiotic pills, do not drink alcohol until you re finished with all of your medicine.    If you re using vaginal cream, apply it as directed. Be aware that the cream may make condoms and diaphragms less effective.    Call your healthcare provider if symptoms do not go away within 4 days of starting treatment. Also call if you have a reaction to the medicine.  Why treatment matters  Even if you have no symptoms  or your symptoms are mild, BV should be treated. Untreated BV can lead to health problems such as:    Increased risk of  delivery if you re pregnant    Increased risk of complications after surgery on the reproductive organs    Possible increased risk of pelvic inflammatory disease (PID)   Date Last Reviewed: 3/1/2017    8347-8007 [employer]. [address] [place] [address]. All rights reserved. This information is not intended as a substitute for professional medical care. Always follow your healthcare professional's instructions.

## 2019-04-17 LAB
HSV1 DNA SPEC QL NAA+PROBE: NEGATIVE
HSV2 DNA SPEC QL NAA+PROBE: NEGATIVE
SPECIMEN SOURCE: NORMAL

## 2019-04-22 ENCOUNTER — TELEPHONE (OUTPATIENT)
Dept: FAMILY MEDICINE | Facility: CLINIC | Age: 39
End: 2019-04-22

## 2019-04-22 NOTE — TELEPHONE ENCOUNTER
Clinic Action Needed:No  Reason for Call: Candelario called to check on lab results from April 15th visit.  Advised that per notes in chart, labs negative, a letter was sent to home address from clinic confirming results.  Routed to: Not routed.    Alondra Dunaway RN  [facility]

## 2019-05-15 ENCOUNTER — OFFICE VISIT (OUTPATIENT)
Dept: FAMILY MEDICINE | Facility: CLINIC | Age: 39
End: 2019-05-15
Payer: COMMERCIAL

## 2019-05-15 VITALS
HEIGHT: 63 IN | OXYGEN SATURATION: 99 % | HEART RATE: 98 BPM | BODY MASS INDEX: 28.7 KG/M2 | TEMPERATURE: 98.8 F | SYSTOLIC BLOOD PRESSURE: 106 MMHG | DIASTOLIC BLOOD PRESSURE: 70 MMHG | WEIGHT: 162 LBS

## 2019-05-15 DIAGNOSIS — F33.0 MILD EPISODE OF RECURRENT MAJOR DEPRESSIVE DISORDER (H): ICD-10-CM

## 2019-05-15 DIAGNOSIS — K21.9 GASTROESOPHAGEAL REFLUX DISEASE WITHOUT ESOPHAGITIS: Primary | ICD-10-CM

## 2019-05-15 LAB — TSH SERPL DL<=0.005 MIU/L-ACNC: 2.48 MU/L (ref 0.4–4)

## 2019-05-15 PROCEDURE — 84443 ASSAY THYROID STIM HORMONE: CPT | Performed by: FAMILY MEDICINE

## 2019-05-15 PROCEDURE — 99214 OFFICE O/P EST MOD 30 MIN: CPT | Performed by: FAMILY MEDICINE

## 2019-05-15 PROCEDURE — 36415 COLL VENOUS BLD VENIPUNCTURE: CPT | Performed by: FAMILY MEDICINE

## 2019-05-15 PROCEDURE — 82306 VITAMIN D 25 HYDROXY: CPT | Performed by: FAMILY MEDICINE

## 2019-05-15 RX ORDER — ESCITALOPRAM OXALATE 10 MG/1
10 TABLET ORAL DAILY
Qty: 30 TABLET | Refills: 0 | Status: SHIPPED | OUTPATIENT
Start: 2019-05-15 | End: 2019-06-13

## 2019-05-15 ASSESSMENT — ANXIETY QUESTIONNAIRES
7. FEELING AFRAID AS IF SOMETHING AWFUL MIGHT HAPPEN: MORE THAN HALF THE DAYS
6. BECOMING EASILY ANNOYED OR IRRITABLE: MORE THAN HALF THE DAYS
GAD7 TOTAL SCORE: 19
3. WORRYING TOO MUCH ABOUT DIFFERENT THINGS: NEARLY EVERY DAY
1. FEELING NERVOUS, ANXIOUS, OR ON EDGE: NEARLY EVERY DAY
2. NOT BEING ABLE TO STOP OR CONTROL WORRYING: NEARLY EVERY DAY
5. BEING SO RESTLESS THAT IT IS HARD TO SIT STILL: NEARLY EVERY DAY

## 2019-05-15 ASSESSMENT — MIFFLIN-ST. JEOR: SCORE: 1381.02

## 2019-05-15 ASSESSMENT — PATIENT HEALTH QUESTIONNAIRE - PHQ9
SUM OF ALL RESPONSES TO PHQ QUESTIONS 1-9: 22
5. POOR APPETITE OR OVEREATING: NEARLY EVERY DAY

## 2019-05-15 NOTE — PROGRESS NOTES
SUBJECTIVE:   Candelario Delatorre is a 38 year old female who presents to clinic today for the following health issues:    Chief Complaint   Patient presents with     Heartburn     acd reflex, taking zantac daily for 2 years - does help     Pain     lymph nodes under arms are very tendor for about 2 years,     Mental Health Problem       Abnormal Mood Symptoms  Onset: years, having trouble getting out of bed.     Description:   Depression: YES  Anxiety: YES    Accompanying Signs & Symptoms:  Still participating in activities that you used to enjoy: no  Fatigue: YES  Irritability: YES  Difficulty concentrating: YES  Changes in appetite: YES- gained weight  Problems with sleep: YES  Heart racing/beating fast : YES  Thoughts of hurting yourself or others: none    History:   Recent stress: no  Prior depression hospitalization: None  Family history of depression: YES  Family history of anxiety: YES    Precipitating factors:   Alcohol/drug use: YES- drink 2-3 days in a week, but also will go a week without drinking     Alleviating factors: none  Therapies Tried and outcome: Zoloft (Sertraline) - gave her stomach issues and loss weight. Also had another medication that she can't recall the name.     PHQ-9 (Pfizer) 5/15/2019   1.  Little interest or pleasure in doing things 3   2.  Feeling down, depressed, or hopeless 3   3.  Trouble falling or staying asleep, or sleeping too much 3   4.  Feeling tired or having little energy 2   5.  Poor appetite or overeating 3   6.  Feeling bad about yourself 2   7.  Trouble concentrating 3   8.  Moving slowly or restless 3   9.  Suicidal or self-harm thoughts 0   PHQ-9 Total Score 22   REYES-7   [employer], 2002; Used with Permission) 5/15/2019   1. Feeling nervous, anxious, or on edge 3   2. Not being able to stop or control worrying 3   3. Worrying too much about different things 3   4. Trouble relaxing 3   5. Being so restless that it is hard to sit still 3   6. Becoming easily annoyed  or irritable 2   7. Feeling afraid, as if something awful might happen 2   REYES-7 Total Score 19         Patient is a 38-year-old female who comes in today for symptoms of depression and anxiety.  She reports that she has struggled with this for many years but the last 2 years have been the worst time.  She was put on Zoloft but had weight gain.  And also had some GI side effects.  She reports that she is not motivated to do anything and she does not do those things that she used to enjoy before.  She is very lethargic and tired.  She denies suicidal ideations.  She is open to trying new medications.  Counseling was offered but patient was not clear numbers.        Other concerns today heartburn and acid reflux.  She has she reports that she has been on Zantac for really long time has not has not noticed any difference.  She says it does not matter what she eats but few minutes after she starts having heartburn.  Denies any history of ulcers.  She is never tried any other medication apart from the Zantac.    Additional history: as documented    Reviewed  and updated as needed this visit by clinical staff  Meds         Reviewed and updated as needed this visit by Provider         Patient Active Problem List   Diagnosis     Anxiety     Tobacco use disorder     Seasonal allergies     No past surgical history on file.    Social History     Tobacco Use     Smoking status: Current Every Day Smoker     Years: 13.00     Types: Cigarettes     Smokeless tobacco: Never Used     Tobacco comment: Smoking History Packs/day: 0.50   Substance Use Topics     Alcohol use: Yes     Comment: Alcoholic Drinks/day: rare, not while pregnant     Family History   Problem Relation Age of Onset     Heart Disease Paternal Grandfather         Heart Disease     Diabetes Maternal Aunt         Diabetes     Diabetes Maternal Uncle         Diabetes     Melanoma No family hx of          Current Outpatient Medications   Medication Sig Dispense Refill  "    escitalopram (LEXAPRO) 10 MG tablet Take 1 tablet (10 mg) by mouth daily 30 tablet 0     omeprazole (PRILOSEC) 20 MG DR capsule Take 1 capsule (20 mg) by mouth daily 30 capsule 0     tretinoin (RETIN-A) 0.05 % external cream Apply pea sized amount at bedtime to face. 45 g 4     No Known Allergies  BP Readings from Last 3 Encounters:   05/15/19 106/70   04/15/19 126/75   04/04/19 110/62    Wt Readings from Last 3 Encounters:   05/15/19 73.5 kg (162 lb)   04/15/19 74.4 kg (164 lb)   04/04/19 73 kg (161 lb)                  Labs reviewed in EPIC    ROS:  Constitutional, HEENT, cardiovascular, pulmonary, gi and gu systems are negative, except as otherwise noted.    OBJECTIVE:     /70   Pulse 98   Temp 98.8  F (37.1  C) (Tympanic)   Ht 1.588 m (5' 2.5\")   Wt 73.5 kg (162 lb)   SpO2 99%   BMI 29.16 kg/m    Body mass index is 29.16 kg/m .  GENERAL: healthy, alert and no distress  EYES: Eyes grossly normal to inspection, PERRL and conjunctivae and sclerae normal  HENT: ear canals and TM's normal, nose and mouth without ulcers or lesions  NECK: no adenopathy, no asymmetry, masses, or scars and thyroid normal to palpation  RESP: lungs clear to auscultation - no rales, rhonchi or wheezes  CV: regular rate and rhythm, normal S1 S2, no S3 or S4, no murmur, click or rub, no peripheral edema and peripheral pulses strong  ABDOMEN: soft, nontender, no hepatosplenomegaly, no masses and bowel sounds normal  MS: no gross musculoskeletal defects noted, no edema  SKIN: no suspicious lesions or rashes  PSYCH: mentation appears normal, affect flat, tearful, anxious, judgement and insight intact and appearance well groomed    Diagnostic Test Results:  none     ASSESSMENT/PLAN:   (K21.9) Gastroesophageal reflux disease without esophagitis  (primary encounter diagnosis)  Comment: Trial of omeprazole if symptoms persist she may need an EGD   Plan: omeprazole (PRILOSEC) 20 MG DR capsule            (F33.0) Mild episode of " recurrent major depressive disorder (H)  Comment: Medication faxed , recommend following up in one month   Plan: escitalopram (LEXAPRO) 10 MG tablet, TSH with         free T4 reflex, Vitamin D Deficiency              FUTURE APPOINTMENTS:       - Follow-up visit in one month or sooner as needed.    Trish Stephens MD  [facility]

## 2019-05-15 NOTE — PATIENT INSTRUCTIONS
Patient Education     Lifestyle Changes for Controlling GERD  When you have GERD, stomach acid feels as if it s backing up toward your mouth. Whether or not you take medicine to control your GERD, your symptoms can often be improved with lifestyle changes. Talk to your healthcare provider about the following suggestions. These suggestions may help you get relief from your symptoms.      Raise your head  Reflux is more likely to strike when you re lying down flat, because stomach fluid can flow backward more easily. Raising the head of your bed 4 to 6 inches can help. To do this:    Slide blocks or books under the legs at the head of your bed. Or, place a wedge under the mattress. Many [employer] can make a suitable wedge for you. The wedge should run from your waist to the top of your head.    Don t just prop your head on several pillows. This increases pressure on your stomach. It can make GERD worse.  Watch your eating habits  Certain foods may increase the acid in your stomach or relax the lower esophageal sphincter. This makes GERD more likely. It s best to avoid the following if they cause you symptoms:    Coffee, tea, and carbonated drinks (with and without caffeine)    Fatty, fried, or spicy food    Mint, chocolate, onions, and tomatoes    Peppermint    Any other foods that seem to irritate your stomach or cause you pain  Relieve the pressure  Tips include the following:    Eat smaller meals, even if you have to eat more often.    Don t lie down right after you eat. Wait a few hours for your stomach to empty.    Avoid tight belts and tight-fitting clothes.    Lose excess weight.  Tobacco and alcohol  Avoid smoking tobacco and drinking alcohol. They can make GERD symptoms worse.  Date Last Reviewed: 7/1/2016 2000-2018 [employer]. [address] [place] [address]. All rights reserved. This information is not intended as a substitute for professional medical care. Always follow your  healthcare professional's instructions.

## 2019-05-15 NOTE — LETTER
May 16, 2019      Candelario HARRISON [place] Ramon  [address]   [place] [address]        Dear ,    We are writing to inform you of your test results.    Test results were within normal parameters.     Resulted Orders   TSH with free T4 reflex   Result Value Ref Range    TSH 2.48 0.40 - 4.00 mU/L       If you have any questions or concerns, please call the clinic at the number listed above.       Sincerely,        Trish Stephens MD

## 2019-05-16 DIAGNOSIS — E55.9 VITAMIN D DEFICIENCY: Primary | ICD-10-CM

## 2019-05-16 LAB — DEPRECATED CALCIDIOL+CALCIFEROL SERPL-MC: 12 UG/L (ref 20–75)

## 2019-05-16 ASSESSMENT — ANXIETY QUESTIONNAIRES: GAD7 TOTAL SCORE: 19

## 2019-05-16 NOTE — RESULT ENCOUNTER NOTE
Please inform patient that test result vitamin d was extremely low. I will fax some vitamin d to the pharmacy for her.   Thank you.     Trish Stephens M.D.

## 2019-05-21 PROBLEM — F33.0 MILD EPISODE OF RECURRENT MAJOR DEPRESSIVE DISORDER (H): Status: ACTIVE | Noted: 2019-05-21

## 2019-05-21 PROBLEM — K21.9 GASTROESOPHAGEAL REFLUX DISEASE WITHOUT ESOPHAGITIS: Status: ACTIVE | Noted: 2019-05-21

## 2019-06-13 ENCOUNTER — VIRTUAL VISIT (OUTPATIENT)
Dept: FAMILY MEDICINE | Facility: CLINIC | Age: 39
End: 2019-06-13
Payer: COMMERCIAL

## 2019-06-13 DIAGNOSIS — K21.9 GASTROESOPHAGEAL REFLUX DISEASE WITHOUT ESOPHAGITIS: ICD-10-CM

## 2019-06-13 DIAGNOSIS — F33.0 MILD EPISODE OF RECURRENT MAJOR DEPRESSIVE DISORDER (H): ICD-10-CM

## 2019-06-13 PROCEDURE — 99441 ZZC PHYSICIAN TELEPHONE EVALUATION 5-10 MIN: CPT | Performed by: FAMILY MEDICINE

## 2019-06-13 RX ORDER — ESCITALOPRAM OXALATE 10 MG/1
20 TABLET ORAL DAILY
Qty: 60 TABLET | Refills: 3 | Status: SHIPPED | OUTPATIENT
Start: 2019-06-13 | End: 2021-03-05

## 2019-06-13 ASSESSMENT — ANXIETY QUESTIONNAIRES
3. WORRYING TOO MUCH ABOUT DIFFERENT THINGS: SEVERAL DAYS
1. FEELING NERVOUS, ANXIOUS, OR ON EDGE: SEVERAL DAYS
GAD7 TOTAL SCORE: 7
2. NOT BEING ABLE TO STOP OR CONTROL WORRYING: SEVERAL DAYS
6. BECOMING EASILY ANNOYED OR IRRITABLE: SEVERAL DAYS
7. FEELING AFRAID AS IF SOMETHING AWFUL MIGHT HAPPEN: SEVERAL DAYS
IF YOU CHECKED OFF ANY PROBLEMS ON THIS QUESTIONNAIRE, HOW DIFFICULT HAVE THESE PROBLEMS MADE IT FOR YOU TO DO YOUR WORK, TAKE CARE OF THINGS AT HOME, OR GET ALONG WITH OTHER PEOPLE: SOMEWHAT DIFFICULT
5. BEING SO RESTLESS THAT IT IS HARD TO SIT STILL: SEVERAL DAYS

## 2019-06-13 ASSESSMENT — PATIENT HEALTH QUESTIONNAIRE - PHQ9
5. POOR APPETITE OR OVEREATING: SEVERAL DAYS
SUM OF ALL RESPONSES TO PHQ QUESTIONS 1-9: 11

## 2019-06-13 NOTE — PROGRESS NOTES
Patient opted to conduct today's return visit via telephone vs an in person visit to the clinic.    I spoke with: Candelario on the phone says her medication is working well. She denies any side effects thus far. We talked about increasing the dose and she is open to that .    The reason for the telephone visit was:  Chief Complaint   Patient presents with     Depression     follow up on Lexapro, refill needed     Refill Request     Omeprazole     PHQ-9 ([employer]) 6/13/2019   1.  Little interest or pleasure in doing things 1   2.  Feeling down, depressed, or hopeless 1   3.  Trouble falling or staying asleep, or sleeping too much 3   4.  Feeling tired or having little energy 2   5.  Poor appetite or overeating 1   6.  Feeling bad about yourself 1   7.  Trouble concentrating 1   8.  Moving slowly or restless 1   9.  Suicidal or self-harm thoughts 0   PHQ-9 Total Score 11   Difficulty at work, home, or with people Somewhat difficult     REYES-7   [employer], 2002; Used with Permission) 6/13/2019   1. Feeling nervous, anxious, or on edge 1   2. Not being able to stop or control worrying 1   3. Worrying too much about different things 1   4. Trouble relaxing 1   5. Being so restless that it is hard to sit still 1   6. Becoming easily annoyed or irritable 1   7. Feeling afraid, as if something awful might happen 1   REYES-7 Total Score 7   If you checked any problems, how difficult have they made it for you to do your work, take care of things at home, or get along with other people? Somewhat difficult       Pertinent history and review of systems: Unobtainable    Assessment: Depression     Advice/instructions given to patient/guardian including prescriptions, follow up appointment or orders for diagnostic testing: Increase to tabs daily . Consider counseling .    Phone call contact time    Call Started at: 11.20    Call Ended at: 11.27

## 2019-06-14 ASSESSMENT — ANXIETY QUESTIONNAIRES: GAD7 TOTAL SCORE: 7

## 2019-07-08 DIAGNOSIS — F33.0 MILD EPISODE OF RECURRENT MAJOR DEPRESSIVE DISORDER (H): ICD-10-CM

## 2019-07-08 NOTE — TELEPHONE ENCOUNTER
"Requested Prescriptions   Pending Prescriptions Disp Refills     escitalopram (LEXAPRO) 10 MG tablet 60 tablet 3     Sig: Take 2 tablets (20 mg) by mouth daily   Last Written Prescription Date:  6/13/19  Last Fill Quantity: 60 tab,  # refills: 3   Last office visit: 6/13/2019 with prescribing provider:  ERVIN Stephens   Future Office Visit:        SSRIs Protocol Failed - 7/8/2019  2:45 PM        Failed - PHQ-9 score less than 5 in past 6 months     Please review last PHQ-9 score.           Passed - Medication is active on med list        Passed - Patient is age 18 or older        Passed - No active pregnancy on record        Passed - No positive pregnancy test in last 12 months        Passed - Recent (6 mo) or future (30 days) visit within the authorizing provider's specialty     Patient had office visit in the last 6 months or has a visit in the next 30 days with authorizing provider or within the authorizing provider's specialty.  See \"Patient Info\" tab in inbasket, or \"Choose Columns\" in Meds & Orders section of the refill encounter.              "

## 2019-07-10 RX ORDER — ESCITALOPRAM OXALATE 10 MG/1
20 TABLET ORAL DAILY
Qty: 60 TABLET | Refills: 3 | OUTPATIENT
Start: 2019-07-10

## 2019-08-05 ENCOUNTER — TELEPHONE (OUTPATIENT)
Dept: FAMILY MEDICINE | Facility: CLINIC | Age: 39
End: 2019-08-05

## 2019-08-05 NOTE — TELEPHONE ENCOUNTER
Pt has called back and if you could try her back she was at work but will try to answer.    Alessia Castro  [facility]

## 2019-08-05 NOTE — TELEPHONE ENCOUNTER
Reason for Call:  Other med request    Detailed comments: Patient states her Citalopram was increased from one tablet to two tablets daily but the pharmacy does not have the new Rx.    Phone Number Patient can be reached at: Home number on file [phone] (home)    Best Time: any    Can we leave a detailed message on this number? YES    Call taken on 8/5/2019 at 8:44 AM by Elicia Kiser

## 2019-08-05 NOTE — TELEPHONE ENCOUNTER
Patient reports:  She has been taking Lexapro 10 mg since last office visit  She was never able to go to 20 mg a day due to insurance will only cover 30 tablets each month of Lexapro.  Patient was at work and did not have time to do depression and anxiety assessments over the phone  Patient reports that she will continue to take Lexapro 10 mg until OV scheduled 8/9/19 and will complete assessments at OV and discuss medication refills.    Devi ROBERTSON Rn

## 2019-08-09 ENCOUNTER — OFFICE VISIT (OUTPATIENT)
Dept: FAMILY MEDICINE | Facility: CLINIC | Age: 39
End: 2019-08-09
Payer: COMMERCIAL

## 2019-08-09 VITALS
HEIGHT: 63 IN | BODY MASS INDEX: 30.48 KG/M2 | HEART RATE: 89 BPM | RESPIRATION RATE: 14 BRPM | DIASTOLIC BLOOD PRESSURE: 80 MMHG | TEMPERATURE: 99.3 F | SYSTOLIC BLOOD PRESSURE: 118 MMHG | OXYGEN SATURATION: 99 % | WEIGHT: 172 LBS

## 2019-08-09 DIAGNOSIS — E55.9 VITAMIN D DEFICIENCY: ICD-10-CM

## 2019-08-09 DIAGNOSIS — K21.9 GASTROESOPHAGEAL REFLUX DISEASE WITHOUT ESOPHAGITIS: ICD-10-CM

## 2019-08-09 DIAGNOSIS — F32.A DEPRESSION, UNSPECIFIED DEPRESSION TYPE: Primary | ICD-10-CM

## 2019-08-09 PROCEDURE — 99213 OFFICE O/P EST LOW 20 MIN: CPT | Performed by: FAMILY MEDICINE

## 2019-08-09 PROCEDURE — 36415 COLL VENOUS BLD VENIPUNCTURE: CPT | Performed by: FAMILY MEDICINE

## 2019-08-09 PROCEDURE — 82306 VITAMIN D 25 HYDROXY: CPT | Performed by: FAMILY MEDICINE

## 2019-08-09 RX ORDER — ESCITALOPRAM OXALATE 20 MG/1
20 TABLET ORAL DAILY
Qty: 30 TABLET | Refills: 3 | Status: SHIPPED | OUTPATIENT
Start: 2019-08-09 | End: 2020-02-07

## 2019-08-09 ASSESSMENT — ANXIETY QUESTIONNAIRES
2. NOT BEING ABLE TO STOP OR CONTROL WORRYING: NEARLY EVERY DAY
7. FEELING AFRAID AS IF SOMETHING AWFUL MIGHT HAPPEN: SEVERAL DAYS
3. WORRYING TOO MUCH ABOUT DIFFERENT THINGS: MORE THAN HALF THE DAYS
5. BEING SO RESTLESS THAT IT IS HARD TO SIT STILL: MORE THAN HALF THE DAYS
6. BECOMING EASILY ANNOYED OR IRRITABLE: SEVERAL DAYS
GAD7 TOTAL SCORE: 14
1. FEELING NERVOUS, ANXIOUS, OR ON EDGE: NEARLY EVERY DAY

## 2019-08-09 ASSESSMENT — PATIENT HEALTH QUESTIONNAIRE - PHQ9
SUM OF ALL RESPONSES TO PHQ QUESTIONS 1-9: 15
5. POOR APPETITE OR OVEREATING: MORE THAN HALF THE DAYS

## 2019-08-09 ASSESSMENT — MIFFLIN-ST. JEOR: SCORE: 1426.38

## 2019-08-09 NOTE — LETTER
August 12, 2019      Candelario HARRISON Juan Ramon  [address]   [place] [address]        Dear ,    We are writing to inform you of your test results.    Covering for Dr Stephens:   Vitamin D levels are now normal.   Nelly Goldsmith CNP     Resulted Orders   Vitamin D Deficiency   Result Value Ref Range    Vitamin D Deficiency screening 32 20 - 75 ug/L      Comment:      Season, race, dietary intake, and treatment affect the concentration of   25-hydroxy-Vitamin D. Values may decrease during winter months and increase   during summer months. Values 20-29 ug/L may indicate Vitamin D insufficiency   and values <20 ug/L may indicate Vitamin D deficiency.  Vitamin D determination is routinely performed by an immunoassay specific for   25 hydroxyvitamin D3.  If an individual is on vitamin D2 (ergocalciferol)   supplementation, please specify 25 OH vitamin D2 and D3 level determination by   LCMSMS test [id number].         If you have any questions or concerns, please call the clinic at the number listed above.       Sincerely,        Trish Stephens MD

## 2019-08-09 NOTE — PROGRESS NOTES
Subjective     Candelario Delatorre is a 38 year old female who presents to clinic today for the following health issues:38 yr old female here for medication refills. She has depression and was prescribed some Lexapro which helped. We increased the dose but there was a glitch with insurance. She says she is tolerating the medication well. No side effects.    Also wanting refills on omeprazole. Says it has been very effective. Denies any side effects  Also will like a Vitamin d levels checked.     HPI   Depression and Anxiety Follow-Up    How are you doing with your depression since your last visit? Patient is still unsure if it is working, she was suppose to increase to 2 pills daily but insurance would not cover over 30pills per mo     How are you doing with your anxiety since your last visit?  Same as above can not tell if it is getting better     Are you having other symptoms that might be associated with depression or anxiety? No    Have you had a significant life event? No     Do you have any concerns with your use of alcohol or other drugs? No    Social History     Tobacco Use     Smoking status: Current Every Day Smoker     Years: 13.00     Types: Cigarettes     Smokeless tobacco: Never Used     Tobacco comment: Smoking History Packs/day: 0.50   Substance Use Topics     Alcohol use: Yes     Comment: Alcoholic Drinks/day: rare, not while pregnant     Drug use: Unknown     Types: Other     Comment: Drug use: No     PHQ 5/15/2019 6/13/2019 8/9/2019   PHQ-9 Total Score 22 11 15   Q9: Thoughts of better off dead/self-harm past 2 weeks Not at all Not at all Not at all     REYES-7 SCORE 5/15/2019 6/13/2019 8/9/2019   Total Score 19 7 14           Suicide Assessment Five-step Evaluation and Treatment (SAFE-T)      How many servings of fruits and vegetables do you eat daily?  0-1    On average, how many sweetened beverages do you drink each day (soda, juice, sweet tea, etc)?   6 soda How many days per week do you miss taking  your medication? 0        Medication Followup of omeprazole she is doing good, on the lexapro could not increase due to insurance     Taking Medication as prescribed: yes on omeprozole but is only taking 1 of the lexapro    Side Effects:  None    Medication Helping Symptoms:  Unsure on the lexapro is not to max dose          Patient Active Problem List   Diagnosis     Anxiety     Tobacco use disorder     Seasonal allergies     Mild episode of recurrent major depressive disorder (H)     Gastroesophageal reflux disease without esophagitis     History reviewed. No pertinent surgical history.    Social History     Tobacco Use     Smoking status: Current Every Day Smoker     Years: 13.00     Types: Cigarettes     Smokeless tobacco: Never Used     Tobacco comment: Smoking History Packs/day: 0.50   Substance Use Topics     Alcohol use: Yes     Comment: Alcoholic Drinks/day: rare, not while pregnant     Family History   Problem Relation Age of Onset     Heart Disease Paternal Grandfather         Heart Disease     Diabetes Maternal Aunt         Diabetes     Diabetes Maternal Uncle         Diabetes     Melanoma No family hx of          Current Outpatient Medications   Medication Sig Dispense Refill     escitalopram (LEXAPRO) 10 MG tablet Take 2 tablets (20 mg) by mouth daily (Patient taking differently: Take 10 mg by mouth ) 60 tablet 3     escitalopram (LEXAPRO) 20 MG tablet Take 1 tablet (20 mg) by mouth daily 30 tablet 3     omeprazole (PRILOSEC) 20 MG DR capsule Take 1 capsule (20 mg) by mouth daily 90 capsule 3     tretinoin (RETIN-A) 0.05 % external cream Apply pea sized amount at bedtime to face. (Patient not taking: Reported on 6/13/2019) 45 g 4     vitamin D3 (CHOLECALCIFEROL) 20877 units capsule Take 1 capsule (50,000 Units) by mouth every 7 days (Patient not taking: Reported on 8/9/2019) 8 capsule 0     No Known Allergies  BP Readings from Last 3 Encounters:   08/09/19 118/80   05/15/19 106/70   04/15/19 126/75     "Wt Readings from Last 3 Encounters:   08/09/19 78 kg (172 lb)   05/15/19 73.5 kg (162 lb)   04/15/19 74.4 kg (164 lb)                    Reviewed and updated as needed this visit by Provider         Review of Systems   ROS COMP: Constitutional, HEENT, cardiovascular, pulmonary, gi and gu systems are negative, except as otherwise noted.      Objective    /80   Pulse 89   Temp 99.3  F (37.4  C) (Tympanic)   Resp 14   Ht 1.588 m (5' 2.5\")   Wt 78 kg (172 lb)   SpO2 99%   BMI 30.96 kg/m    Body mass index is 30.96 kg/m .  Physical Exam   GENERAL: healthy, alert and no distress  EYES: Eyes grossly normal to inspection, PERRL and conjunctivae and sclerae normal  HENT: ear canals and TM's normal, nose and mouth without ulcers or lesions  NECK: no adenopathy, no asymmetry, masses, or scars and thyroid normal to palpation  RESP: lungs clear to auscultation - no rales, rhonchi or wheezes  CV: regular rate and rhythm, normal S1 S2, no S3 or S4, no murmur, click or rub, no peripheral edema and peripheral pulses strong  MS: no gross musculoskeletal defects noted, no edema  SKIN: no suspicious lesions or rashes    Diagnostic Test Results:  none         Assessment & Plan     1. Depression, unspecified depression type  Medication refilled  - symptoms have improved.   - escitalopram (LEXAPRO) 20 MG tablet; Take 1 tablet (20 mg) by mouth daily  Dispense: 30 tablet; Refill: 3    2. Gastroesophageal reflux disease without esophagitis  Medication refilled   - omeprazole (PRILOSEC) 20 MG DR capsule; Take 1 capsule (20 mg) by mouth daily  Dispense: 90 capsule; Refill: 3    3. Vitamin D deficiency  Patient will be notified of results  - Vitamin D Deficiency     Tobacco Cessation:   reports that she has been smoking cigarettes.  She has smoked for the past 13.00 years. She has never used smokeless tobacco.  Tobacco Cessation Action Plan: Information offered: Patient not interested at this time      BMI:   Estimated body mass " "index is 30.96 kg/m  as calculated from the following:    Height as of this encounter: 1.588 m (5' 2.5\").    Weight as of this encounter: 78 kg (172 lb).   Weight management plan: Discussed healthy diet and exercise guidelines        FUTURE APPOINTMENTS:       - Follow-up visit in three months or sooner as needed.  Patient Instructions         Thank you for choosing [facility].  You may be receiving an email and/or telephone survey request from [facility] Customer Experience regarding your visit today.  Please take a few minutes to respond to the survey to let us know how we are doing.      If you have questions or concerns, please contact us via [employer] or you can contact your care team at [phone].    Our Clinic hours are:  Monday 6:40 am  to 7:00 pm  Tuesday -Friday 6:40 am to 5:00 pm    The [place] outpatient lab hours are:  Monday - Friday 6:10 am to 4:45 pm  Saturdays 7:00 am to 11:00 am  Appointments are required, call [phone]    If you have clinical questions after hours or would like to schedule an appointment,  call the clinic at [phone].      No follow-ups on file.    Trish Stephens MD  [facility]      "

## 2019-08-09 NOTE — PATIENT INSTRUCTIONS
Thank you for choosing [facility].  You may be receiving an email and/or telephone survey request from [facility] Customer Experience regarding your visit today.  Please take a few minutes to respond to the survey to let us know how we are doing.      If you have questions or concerns, please contact us via [employer] or you can contact your care team at [phone].    Our Clinic hours are:  Monday 6:40 am  to 7:00 pm  Tuesday -Friday 6:40 am to 5:00 pm    The [place] outpatient lab hours are:  Monday - Friday 6:10 am to 4:45 pm  Saturdays 7:00 am to 11:00 am  Appointments are required, call [phone]    If you have clinical questions after hours or would like to schedule an appointment,  call the clinic at [phone].

## 2019-08-10 ASSESSMENT — ANXIETY QUESTIONNAIRES: GAD7 TOTAL SCORE: 14

## 2019-08-12 LAB — DEPRECATED CALCIDIOL+CALCIFEROL SERPL-MC: 32 UG/L (ref 20–75)

## 2019-11-16 ENCOUNTER — TRANSFERRED RECORDS (OUTPATIENT)
Dept: HEALTH INFORMATION MANAGEMENT | Facility: CLINIC | Age: 39
End: 2019-11-16

## 2019-11-25 ENCOUNTER — TELEPHONE (OUTPATIENT)
Dept: FAMILY MEDICINE | Facility: CLINIC | Age: 39
End: 2019-11-25

## 2019-11-25 NOTE — TELEPHONE ENCOUNTER
Panel Management Review      Patient has the following on her problem list:     Depression / Dysthymia review    Measure:  Needs PHQ-9 score of 4 or less during index window.  Administer PHQ-9 and if score is 5 or more, send encounter to provider for next steps.    4 - 8 month window range: 9/16/19-1/14/20    PHQ-9 SCORE 5/15/2019 6/13/2019 8/9/2019   PHQ-9 Total Score 22 11 15       If PHQ-9 recheck is 5 or more, route to provider for next steps.    Patient is due for:  PHQ9      Composite cancer screening  Chart review shows that this patient is due/due soon for the following None  Summary:    Patient is due/failing the following:   PHQ9    Action needed:   Patient needs to do PHQ9.  Questions for provider review:    None                                                                                                                                    ANDREW Orellana MA       Chart routed to Care Team .

## 2019-11-25 NOTE — LETTER
November 25, 2019      Candelario Delatorre  [address] [address]        Dear Candelario,     We have been unable to reach you by phone Your [facility] Care Team works hard to make sure that you receive exceptional care. Enclosed you will find a copy of the phq-9/gad7 depression form  that our clinic uses to monitor and manage your Depression/anxiety  This test is an assessment tool that we can use to determine how well your Depression  is controlled. Please fill out and mail back the enclosed  form. Enclosed is a stamped addressed envelope for you to mail the form  back to the clinic in. Also, please call the clinic to update your current contact information. Thank You .             Sincerely,        Trish Stephens MD

## 2019-11-25 NOTE — TELEPHONE ENCOUNTER
Panel Management Review      Patient has the following on her problem list:     Depression / Dysthymia review    Measure:  Needs PHQ-9 score of 4 or less during index window.  Administer PHQ-9 and if score is 5 or more, send encounter to provider for next steps.  4 - 8 month window range: 9/16/19-1/14/20    PHQ-9 SCORE 5/15/2019 6/13/2019 8/9/2019   PHQ-9 Total Score 22 11 15       If PHQ-9 recheck is 5 or more, route to provider for next steps.    Patient is due for:  PHQ9      Composite cancer screening  Chart review shows that this patient is due/due soon for the following None  Summary:    Patient is due/failing the following:   PHQ9    Action needed:   Patient needs to do PHQ9.    Type of outreach:    phone, called # listed and voicemail said a different name. Will mail letter with copy of phq-9/gad7.    Bert ROBERTSON CMA

## 2020-02-06 DIAGNOSIS — F32.A DEPRESSION, UNSPECIFIED DEPRESSION TYPE: ICD-10-CM

## 2020-02-06 NOTE — LETTER
[facility]  [address] [address]  Phone: [phone]       February 7, 2020         Candelario Delatorre  [address] [place] [address]            Dear Candelario:    We are concerned about your health care.  We recently provided you with medication refills.  Many medications require routine follow-up with your doctor.    Your prescription(s) have been refilled for 30 days so you may have time for the above noted follow-up. Please call to schedule soon so we can assure you have an appointment before your next refills are needed.    Thank you,      Trish Stephens MD / caryn

## 2020-02-06 NOTE — TELEPHONE ENCOUNTER
"Requested Prescriptions   Pending Prescriptions Disp Refills     escitalopram (LEXAPRO) 20 MG tablet [Pharmacy Med Name: Escitalopram Oxalate 20 MG Oral Tablet]  Last Written Prescription Date:  8/9/19  Last Fill Quantity: 30,  # refills: 3   Last Office Visit with [facility], P or [facility] prescribing provider:  8/9/19   Future Office Visit:       0     Sig: TAKE 1 TABLET BY MOUTH ONCE DAILY       SSRIs Protocol Failed - 2/6/2020 12:47 PM        Failed - PHQ-9 score less than 5 in past 6 months     Please review last PHQ-9 score.           Failed - Recent (6 mo) or future (30 days) visit within the authorizing provider's specialty     Patient had office visit in the last 6 months or has a visit in the next 30 days with authorizing provider or within the authorizing provider's specialty.  See \"Patient Info\" tab in inbasket, or \"Choose Columns\" in Meds & Orders section of the refill encounter.            Passed - Medication is active on med list        Passed - Patient is age 18 or older        Passed - No active pregnancy on record        Passed - No positive pregnancy test in last 12 months          "

## 2020-02-07 RX ORDER — ESCITALOPRAM OXALATE 20 MG/1
TABLET ORAL
Qty: 30 TABLET | Refills: 0 | Status: SHIPPED | OUTPATIENT
Start: 2020-02-07 | End: 2021-03-05

## 2021-03-05 ENCOUNTER — OFFICE VISIT (OUTPATIENT)
Dept: FAMILY MEDICINE | Facility: CLINIC | Age: 41
End: 2021-03-05
Payer: COMMERCIAL

## 2021-03-05 VITALS
HEART RATE: 91 BPM | BODY MASS INDEX: 32.6 KG/M2 | WEIGHT: 184 LBS | HEIGHT: 63 IN | DIASTOLIC BLOOD PRESSURE: 68 MMHG | SYSTOLIC BLOOD PRESSURE: 120 MMHG | RESPIRATION RATE: 16 BRPM | TEMPERATURE: 96.7 F | OXYGEN SATURATION: 100 %

## 2021-03-05 DIAGNOSIS — R60.0 BILATERAL LOWER EXTREMITY EDEMA: ICD-10-CM

## 2021-03-05 DIAGNOSIS — N39.3 FEMALE STRESS INCONTINENCE: ICD-10-CM

## 2021-03-05 DIAGNOSIS — N92.6 IRREGULAR PERIODS: Primary | ICD-10-CM

## 2021-03-05 DIAGNOSIS — F33.2 SEVERE EPISODE OF RECURRENT MAJOR DEPRESSIVE DISORDER, WITHOUT PSYCHOTIC FEATURES (H): ICD-10-CM

## 2021-03-05 LAB
ERYTHROCYTE [DISTWIDTH] IN BLOOD BY AUTOMATED COUNT: 15.3 % (ref 10–15)
HCT VFR BLD AUTO: 36.1 % (ref 35–47)
HGB BLD-MCNC: 10.8 G/DL (ref 11.7–15.7)
MCH RBC QN AUTO: 24.7 PG (ref 26.5–33)
MCHC RBC AUTO-ENTMCNC: 29.9 G/DL (ref 31.5–36.5)
MCV RBC AUTO: 82 FL (ref 78–100)
PLATELET # BLD AUTO: 265 10E9/L (ref 150–450)
RBC # BLD AUTO: 4.38 10E12/L (ref 3.8–5.2)
TSH SERPL DL<=0.005 MIU/L-ACNC: 3.38 MU/L (ref 0.4–4)
WBC # BLD AUTO: 7.3 10E9/L (ref 4–11)

## 2021-03-05 PROCEDURE — 84443 ASSAY THYROID STIM HORMONE: CPT | Performed by: FAMILY MEDICINE

## 2021-03-05 PROCEDURE — 36415 COLL VENOUS BLD VENIPUNCTURE: CPT | Performed by: FAMILY MEDICINE

## 2021-03-05 PROCEDURE — 99215 OFFICE O/P EST HI 40 MIN: CPT | Performed by: FAMILY MEDICINE

## 2021-03-05 PROCEDURE — 85027 COMPLETE CBC AUTOMATED: CPT | Performed by: FAMILY MEDICINE

## 2021-03-05 RX ORDER — ESCITALOPRAM OXALATE 10 MG/1
10 TABLET ORAL DAILY
Qty: 30 TABLET | Refills: 1 | Status: SHIPPED | OUTPATIENT
Start: 2021-03-05 | End: 2021-04-21

## 2021-03-05 ASSESSMENT — PATIENT HEALTH QUESTIONNAIRE - PHQ9
SUM OF ALL RESPONSES TO PHQ QUESTIONS 1-9: 26
5. POOR APPETITE OR OVEREATING: NEARLY EVERY DAY

## 2021-03-05 ASSESSMENT — ANXIETY QUESTIONNAIRES
7. FEELING AFRAID AS IF SOMETHING AWFUL MIGHT HAPPEN: NEARLY EVERY DAY
2. NOT BEING ABLE TO STOP OR CONTROL WORRYING: NEARLY EVERY DAY
3. WORRYING TOO MUCH ABOUT DIFFERENT THINGS: NEARLY EVERY DAY
6. BECOMING EASILY ANNOYED OR IRRITABLE: NEARLY EVERY DAY
1. FEELING NERVOUS, ANXIOUS, OR ON EDGE: NEARLY EVERY DAY
GAD7 TOTAL SCORE: 21
IF YOU CHECKED OFF ANY PROBLEMS ON THIS QUESTIONNAIRE, HOW DIFFICULT HAVE THESE PROBLEMS MADE IT FOR YOU TO DO YOUR WORK, TAKE CARE OF THINGS AT HOME, OR GET ALONG WITH OTHER PEOPLE: EXTREMELY DIFFICULT
5. BEING SO RESTLESS THAT IT IS HARD TO SIT STILL: NEARLY EVERY DAY

## 2021-03-05 ASSESSMENT — MIFFLIN-ST. JEOR: SCORE: 1477.36

## 2021-03-05 NOTE — PATIENT INSTRUCTIONS
1. To lab to check thyroid hormone.    You will call to schedule the ultrasound  [phone].    Schedule therapy with Rach Nick at the  or through the schedule line [phone].  First visit is 60 minutes. Check with insurance about coverage.  She sees people short term for therapy and then refers you on to a permanent therapist if needed.    Start Lexapro 10mg pill daily.     Common starting side effects that usually wear off after 1-2 weeks when your body is used to the medication are nausea, diarrhea, and headaches.  Sometimes happen side effects: weight gain, low libido, restless legs, headaches, fatigue  Very rare but serious side effects: severe mood changes, psychosis, if these happen stop the medication right away and call me.    Start with taking it in the morning for 3 days but if it makes you too sleepy switch to taking it before bedtime.  This medication takes 3-4 weeks to start to see improvement and 6 weeks to see full effect at that dose.    Please either send me a phone or [employer] message in 2 weeks to tell me if you had any side effects with starting the medication and if those side effects are still going on.    Please schedule a phone visit in 5 weeks with Dr. García for recheck medication dosing.      Try graduated compression stockings for the legs, especially wear them at work.

## 2021-03-05 NOTE — PROGRESS NOTES
"    Assessment & Plan     Irregular periods  Rule out thyroid abnormality, check hgb for anemia with frequent periods.  Rule out structural issues like fibroids with ultrasound.  - TSH with free T4 reflex  - CBC with platelets  - US Pelvic Complete with Transvaginal    Severe episode of recurrent major depressive disorder, no psychosis (H)  Discussed, plan to start lexapro, start therapy with Rach.  -discussed risks, benefits, and side effects of this new medication. Patient understands and is in agreement.  - escitalopram (LEXAPRO) 10 MG tablet  Dispense: 30 tablet; Refill: 1    Female stress incontinence  Awaiting US too, to check for fibroids as a reason for incontinence too.  - UROLOGY ADULT REFERRAL    Bilateral lower extremity edema: new in the last year when up walking at work, none currently, resolves with sleep.  Plan compression stockings and reasses at next appt.  Has already had a chest CT PE scan that was normal as below.    Total of 43 minutes was spent in appointment with patient and chart review.       Tobacco Cessation:   reports that she has been smoking cigarettes. She has smoked for the past 13.00 years. She has never used smokeless tobacco.  Tobacco Cessation Action Plan: not discussed    BMI:   Estimated body mass index is 32.68 kg/m  as calculated from the following:    Height as of this encounter: 1.598 m (5' 2.91\").    Weight as of this encounter: 83.5 kg (184 lb).   Weight management plan: rule out thyroid, if normal plan healthy eating and exercise.    Patient Instructions   1. To lab to check thyroid hormone.    You will call to schedule the ultrasound  [phone].    Schedule therapy with Rach Nick at the  or through the schedule line [phone].  First visit is 60 minutes. Check with insurance about coverage.  She sees people short term for therapy and then refers you on to a permanent therapist if needed.    Start Lexapro 10mg pill daily.     Common starting side " effects that usually wear off after 1-2 weeks when your body is used to the medication are nausea, diarrhea, and headaches.  Sometimes happen side effects: weight gain, low libido, restless legs, headaches, fatigue  Very rare but serious side effects: severe mood changes, psychosis, if these happen stop the medication right away and call me.    Start with taking it in the morning for 3 days but if it makes you too sleepy switch to taking it before bedtime.  This medication takes 3-4 weeks to start to see improvement and 6 weeks to see full effect at that dose.    Please either send me a phone or [employer] message in 2 weeks to tell me if you had any side effects with starting the medication and if those side effects are still going on.    Please schedule a phone visit in 5 weeks with Dr. García for recheck medication dosing.      Try graduated compression stockings for the legs, especially wear them at work.        Return in about 5 weeks (around 4/9/2021).    Jus García MD  [facility]    Dago Palomino is a 40 year old who presents for the following health issues     HPI    Mood: History of depression and anxiety and was on medication in the past.   lexapro in the past  Kids all moved out now.  Has thought of 'what is I wasn't here'. But no plan at all, would never harm self due to kids/finance.  PHQ 9: 26    PHQ 5/15/2019 6/13/2019 8/9/2019   PHQ-9 Total Score 22 11 15   Q9: Thoughts of better off dead/self-harm past 2 weeks Not at all Not at all Not at all     REYES-7 SCORE 5/15/2019 6/13/2019 8/9/2019   Total Score 19 7 14           Musculoskeletal problem/pain  Onset/Duration: ongoing for over a year off and on. Patient states she notices it more when she is active. Patient states she can feel her legs swell and by the time she is done with work her legs are swollen. Does goes down and away when off feet and in the morning.    Patient has noticed some random lump on her right  "lower leg the looks white when it decides to show up. Patient states he feet are always cold. Patient states she had also noticed that her private parts feel like they are falling out (inttermittently). Patient did start working again so she thinks maybe that could be why.  Had shortness of breath in the past so did have a CT PE scan 11/16/2019.  Has some shortness of breath with mask at work.  Description  Location: leg/ ankles - bilateral  Joint Swelling: YES  Redness: no  Pain: YES- tight and sore  Warmth: no  Intensity:  0/10. The less time spent on her feet the better her legs look and feel.  Progression of Symptoms:  worsening and intermittent  Accompanying signs and symptoms:   Fevers: no  Numbness/tingling/weakness: YES- numbness and tingling.  History  Trauma to the area: no  Recent illness:  no  Previous similar problem: YES- a little over a year ago patient was in the ER.  Previous evaluation:  YES  Precipitating or alleviating factors:  Aggravating factors include: standing and walking  Therapies tried and outcome: nothing    Working as a .    If time:     Irregular periods: x 8 months. Patient not sure if there is some menopausal stuff going on but she had had irregular periods for awhile. Patient states  She will have light spotting for 4 days and then for 2 days it is full blown period and the next two days are back to light spotting. Patient states she has had night sweats as well. Patient states she has joslyn told in the past that she had a cyst. She is not sure if that is playing a part in this as well.  Mom had early menopause.  Feels like vagina could fall out after a long shift.    Review of Systems   Constitutional, HEENT, cardiovascular, pulmonary, gi and gu systems are negative, except as otherwise noted.      Objective    /68   Pulse 91   Temp 96.7  F (35.9  C) (Tympanic)   Resp 16   Ht 1.598 m (5' 2.91\")   Wt 83.5 kg (184 lb)   LMP 03/01/2021 (Exact Date)   SpO2 100%   BMI " 32.68 kg/m    Body mass index is 32.68 kg/m .  Physical Exam   GENERAL: healthy, alert and no distress  NECK: no adenopathy, no asymmetry, masses, or scars and thyroid normal to palpation  RESP: lungs clear to auscultation - no rales, rhonchi or wheezes  CV: regular rate and rhythm, normal S1 S2, no S3 or S4, no murmur, click or rub, no peripheral edema and peripheral pulses strong  ABDOMEN: soft, nontender, no hepatosplenomegaly, no masses and bowel sounds normal   (female): normal female external genitalia, normal urethral meatus, vaginal mucosa, normal cervix without masses or discharge, no evidence of cervical prolapse, mild bladder prolapse.  MS: no gross musculoskeletal defects noted, no edema

## 2021-03-06 ASSESSMENT — ANXIETY QUESTIONNAIRES: GAD7 TOTAL SCORE: 21

## 2021-03-07 ENCOUNTER — MYC MEDICAL ADVICE (OUTPATIENT)
Dept: FAMILY MEDICINE | Facility: CLINIC | Age: 41
End: 2021-03-07

## 2021-03-18 ENCOUNTER — HOSPITAL ENCOUNTER (OUTPATIENT)
Dept: ULTRASOUND IMAGING | Facility: CLINIC | Age: 41
Discharge: HOME | End: 2021-03-18
Attending: FAMILY MEDICINE | Admitting: FAMILY MEDICINE
Payer: COMMERCIAL

## 2021-03-18 DIAGNOSIS — N92.6 IRREGULAR PERIODS: ICD-10-CM

## 2021-03-18 PROCEDURE — 76830 TRANSVAGINAL US NON-OB: CPT

## 2021-03-20 ENCOUNTER — HEALTH MAINTENANCE LETTER (OUTPATIENT)
Age: 41
End: 2021-03-20

## 2021-04-21 ENCOUNTER — OFFICE VISIT (OUTPATIENT)
Dept: FAMILY MEDICINE | Facility: CLINIC | Age: 41
End: 2021-04-21
Payer: COMMERCIAL

## 2021-04-21 ENCOUNTER — TRANSFERRED RECORDS (OUTPATIENT)
Dept: HEALTH INFORMATION MANAGEMENT | Facility: CLINIC | Age: 41
End: 2021-04-21

## 2021-04-21 VITALS
DIASTOLIC BLOOD PRESSURE: 56 MMHG | BODY MASS INDEX: 30.38 KG/M2 | SYSTOLIC BLOOD PRESSURE: 118 MMHG | RESPIRATION RATE: 16 BRPM | WEIGHT: 171 LBS | OXYGEN SATURATION: 99 % | HEART RATE: 92 BPM | TEMPERATURE: 99.5 F

## 2021-04-21 DIAGNOSIS — F41.9 ANXIETY: Primary | ICD-10-CM

## 2021-04-21 DIAGNOSIS — F33.0 MILD EPISODE OF RECURRENT MAJOR DEPRESSIVE DISORDER (H): ICD-10-CM

## 2021-04-21 PROCEDURE — 99214 OFFICE O/P EST MOD 30 MIN: CPT | Performed by: FAMILY MEDICINE

## 2021-04-21 RX ORDER — ESCITALOPRAM OXALATE 20 MG/1
20 TABLET ORAL DAILY
Qty: 90 TABLET | Refills: 1 | Status: SHIPPED | OUTPATIENT
Start: 2021-04-21 | End: 2021-11-08

## 2021-04-21 SDOH — HEALTH STABILITY: MENTAL HEALTH: HOW OFTEN DO YOU HAVE A DRINK CONTAINING ALCOHOL?: 2-4 TIMES A MONTH

## 2021-04-21 SDOH — HEALTH STABILITY: MENTAL HEALTH: HOW MANY STANDARD DRINKS CONTAINING ALCOHOL DO YOU HAVE ON A TYPICAL DAY?: 1 OR 2

## 2021-04-21 SDOH — HEALTH STABILITY: MENTAL HEALTH: HOW OFTEN DO YOU HAVE 6 OR MORE DRINKS ON ONE OCCASION?: NOT ASKED

## 2021-04-21 ASSESSMENT — PATIENT HEALTH QUESTIONNAIRE - PHQ9
5. POOR APPETITE OR OVEREATING: MORE THAN HALF THE DAYS
SUM OF ALL RESPONSES TO PHQ QUESTIONS 1-9: 10

## 2021-04-21 ASSESSMENT — ANXIETY QUESTIONNAIRES
6. BECOMING EASILY ANNOYED OR IRRITABLE: SEVERAL DAYS
3. WORRYING TOO MUCH ABOUT DIFFERENT THINGS: MORE THAN HALF THE DAYS
7. FEELING AFRAID AS IF SOMETHING AWFUL MIGHT HAPPEN: SEVERAL DAYS
GAD7 TOTAL SCORE: 11
2. NOT BEING ABLE TO STOP OR CONTROL WORRYING: MORE THAN HALF THE DAYS
IF YOU CHECKED OFF ANY PROBLEMS ON THIS QUESTIONNAIRE, HOW DIFFICULT HAVE THESE PROBLEMS MADE IT FOR YOU TO DO YOUR WORK, TAKE CARE OF THINGS AT HOME, OR GET ALONG WITH OTHER PEOPLE: SOMEWHAT DIFFICULT
1. FEELING NERVOUS, ANXIOUS, OR ON EDGE: MORE THAN HALF THE DAYS
5. BEING SO RESTLESS THAT IT IS HARD TO SIT STILL: SEVERAL DAYS

## 2021-04-21 NOTE — PATIENT INSTRUCTIONS
Plan to increase to lexapro 20mg daily, take 2 pills at once with the 10mg you have left.    In 5-6 weeks just send me a [employer] message with how the increase is going.    Schedule therapy with Rach Nick at the  or through the schedule line [phone].  First visit is 60 minutes. Check with insurance about coverage.  She sees people short term for therapy and then refers you on to a permanent therapist if needed.    Cognitive Behavioral Therapy for Anxiety resources  Workbooks  apps relaxation resources like CALM or Headspace.
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
## 2021-04-22 ASSESSMENT — ANXIETY QUESTIONNAIRES: GAD7 TOTAL SCORE: 11

## 2021-09-15 ENCOUNTER — OFFICE VISIT (OUTPATIENT)
Dept: FAMILY MEDICINE | Facility: CLINIC | Age: 41
End: 2021-09-15
Payer: COMMERCIAL

## 2021-09-15 VITALS
HEART RATE: 90 BPM | TEMPERATURE: 99.3 F | RESPIRATION RATE: 16 BRPM | SYSTOLIC BLOOD PRESSURE: 118 MMHG | WEIGHT: 179.2 LBS | OXYGEN SATURATION: 98 % | DIASTOLIC BLOOD PRESSURE: 76 MMHG | BODY MASS INDEX: 31.83 KG/M2

## 2021-09-15 DIAGNOSIS — F17.200 NICOTINE DEPENDENCE, UNCOMPLICATED, UNSPECIFIED NICOTINE PRODUCT TYPE: ICD-10-CM

## 2021-09-15 DIAGNOSIS — S89.92XD INJURY OF LEFT KNEE, SUBSEQUENT ENCOUNTER: Primary | ICD-10-CM

## 2021-09-15 DIAGNOSIS — M67.88 ACHILLES TENDINOSIS: ICD-10-CM

## 2021-09-15 DIAGNOSIS — F17.200 TOBACCO USE DISORDER: ICD-10-CM

## 2021-09-15 PROCEDURE — 99406 BEHAV CHNG SMOKING 3-10 MIN: CPT | Performed by: FAMILY MEDICINE

## 2021-09-15 PROCEDURE — 99213 OFFICE O/P EST LOW 20 MIN: CPT | Mod: 25 | Performed by: FAMILY MEDICINE

## 2021-09-15 RX ORDER — NICOTINE 21 MG/24HR
1 PATCH, TRANSDERMAL 24 HOURS TRANSDERMAL EVERY 24 HOURS
Qty: 28 PATCH | Refills: 1 | Status: SHIPPED | OUTPATIENT
Start: 2021-09-15 | End: 2023-04-18

## 2021-09-15 NOTE — PATIENT INSTRUCTIONS
Nicotine Patch    Dosing:  <10 cigarettes per day  Weeks 1-6 Use one 14 mg patch per day   Weeks 7-8 Use one 7 mg patch per day       How to use the Nicotine Patch:    Apply a new patch to non-hairy, clean, dry skin on the upper body or upper outer arm.  Remove backing from patch and press on skin.  Hold for 10 seconds.    Apply patch around the same time every day.    Wash your hands after applying the patch.    Remove the patch at the end of the day before you go to bed.    Apply a new patch the next morning.    Do not apply the patch to an area where you have worn a patch in the last week.   This will help prevent or reduce skin irritation.    Some Tips:    Do not smoke while you are using the nicotine patch!    Do not cut the nicotine patch -it will be ineffective.    Remove the patch prior to receiving an MRI since the patch may contain some metal.    Do not put the patch on irritated, cut, or burned skin.    If the patch falls off and cannot be reapplied, put on a new patch.    Follow -up with your health care professional if you have any questions and also to help taper your nicotine patch dose.    Side Effects:  Some people experience some skin irritation where the patch was placed.  Moving around the site where you are putting the patch each day should help.  If you experience any other troublesome or unusual side effects, call your health care professional.
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
## 2021-10-24 ENCOUNTER — HEALTH MAINTENANCE LETTER (OUTPATIENT)
Age: 41
End: 2021-10-24

## 2021-11-04 DIAGNOSIS — F41.9 ANXIETY: ICD-10-CM

## 2021-11-04 DIAGNOSIS — F33.0 MILD EPISODE OF RECURRENT MAJOR DEPRESSIVE DISORDER (H): ICD-10-CM

## 2021-11-04 NOTE — TELEPHONE ENCOUNTER
Pending Prescriptions:                       Disp   Refills    escitalopram (LEXAPRO) 20 MG tablet [Phar*90 tab*0            Sig: Take 1 tablet by mouth once daily    Routing refill request to provider for review/approval because:  Labs out of range:    PHQ-9 score:    PHQ 4/21/2021   PHQ-9 Total Score 10   Q9: Thoughts of better off dead/self-harm past 2 weeks Not at all     Pavan Lazaro RN

## 2021-11-08 RX ORDER — ESCITALOPRAM OXALATE 20 MG/1
TABLET ORAL
Qty: 90 TABLET | Refills: 1 | Status: SHIPPED | OUTPATIENT
Start: 2021-11-08 | End: 2023-04-18

## 2022-04-10 ENCOUNTER — HEALTH MAINTENANCE LETTER (OUTPATIENT)
Age: 42
End: 2022-04-10

## 2022-10-16 ENCOUNTER — HEALTH MAINTENANCE LETTER (OUTPATIENT)
Age: 42
End: 2022-10-16

## 2023-04-18 ENCOUNTER — OFFICE VISIT (OUTPATIENT)
Dept: FAMILY MEDICINE | Facility: CLINIC | Age: 43
End: 2023-04-18
Payer: COMMERCIAL

## 2023-04-18 VITALS
SYSTOLIC BLOOD PRESSURE: 122 MMHG | HEIGHT: 63 IN | TEMPERATURE: 98.2 F | WEIGHT: 178.6 LBS | OXYGEN SATURATION: 100 % | RESPIRATION RATE: 16 BRPM | BODY MASS INDEX: 31.64 KG/M2 | HEART RATE: 96 BPM | DIASTOLIC BLOOD PRESSURE: 72 MMHG

## 2023-04-18 DIAGNOSIS — Z11.59 NEED FOR HEPATITIS C SCREENING TEST: ICD-10-CM

## 2023-04-18 DIAGNOSIS — N93.8 DUB (DYSFUNCTIONAL UTERINE BLEEDING): ICD-10-CM

## 2023-04-18 DIAGNOSIS — F33.0 MILD EPISODE OF RECURRENT MAJOR DEPRESSIVE DISORDER (H): ICD-10-CM

## 2023-04-18 DIAGNOSIS — Z12.4 CERVICAL CANCER SCREENING: ICD-10-CM

## 2023-04-18 DIAGNOSIS — Z00.00 ROUTINE GENERAL MEDICAL EXAMINATION AT A HEALTH CARE FACILITY: Primary | ICD-10-CM

## 2023-04-18 DIAGNOSIS — D50.0 IRON DEFICIENCY ANEMIA DUE TO CHRONIC BLOOD LOSS: ICD-10-CM

## 2023-04-18 DIAGNOSIS — Z12.31 ENCOUNTER FOR SCREENING MAMMOGRAM FOR BREAST CANCER: ICD-10-CM

## 2023-04-18 LAB
FERRITIN SERPL-MCNC: 5 NG/ML (ref 6–175)
HCV AB SERPL QL IA: NONREACTIVE
HGB BLD-MCNC: 10.7 G/DL (ref 11.7–15.7)
IRON BINDING CAPACITY (ROCHE): 511 UG/DL (ref 240–430)
IRON SATN MFR SERPL: 3 % (ref 15–46)
IRON SERPL-MCNC: 15 UG/DL (ref 37–145)
TSH SERPL DL<=0.005 MIU/L-ACNC: 3.83 UIU/ML (ref 0.3–4.2)

## 2023-04-18 PROCEDURE — 85018 HEMOGLOBIN: CPT | Performed by: FAMILY MEDICINE

## 2023-04-18 PROCEDURE — 87624 HPV HI-RISK TYP POOLED RSLT: CPT | Performed by: FAMILY MEDICINE

## 2023-04-18 PROCEDURE — 83550 IRON BINDING TEST: CPT | Performed by: FAMILY MEDICINE

## 2023-04-18 PROCEDURE — 84443 ASSAY THYROID STIM HORMONE: CPT | Performed by: FAMILY MEDICINE

## 2023-04-18 PROCEDURE — 82728 ASSAY OF FERRITIN: CPT | Performed by: FAMILY MEDICINE

## 2023-04-18 PROCEDURE — 99213 OFFICE O/P EST LOW 20 MIN: CPT | Mod: 25 | Performed by: FAMILY MEDICINE

## 2023-04-18 PROCEDURE — 83540 ASSAY OF IRON: CPT | Performed by: FAMILY MEDICINE

## 2023-04-18 PROCEDURE — 86803 HEPATITIS C AB TEST: CPT | Performed by: FAMILY MEDICINE

## 2023-04-18 PROCEDURE — G0145 SCR C/V CYTO,THINLAYER,RESCR: HCPCS | Performed by: FAMILY MEDICINE

## 2023-04-18 PROCEDURE — 36415 COLL VENOUS BLD VENIPUNCTURE: CPT | Performed by: FAMILY MEDICINE

## 2023-04-18 PROCEDURE — 99396 PREV VISIT EST AGE 40-64: CPT | Performed by: FAMILY MEDICINE

## 2023-04-18 ASSESSMENT — ENCOUNTER SYMPTOMS
FREQUENCY: 1
NAUSEA: 1
HEARTBURN: 1
NERVOUS/ANXIOUS: 1
ARTHRALGIAS: 1
ABDOMINAL PAIN: 1

## 2023-04-18 ASSESSMENT — PATIENT HEALTH QUESTIONNAIRE - PHQ9
10. IF YOU CHECKED OFF ANY PROBLEMS, HOW DIFFICULT HAVE THESE PROBLEMS MADE IT FOR YOU TO DO YOUR WORK, TAKE CARE OF THINGS AT HOME, OR GET ALONG WITH OTHER PEOPLE: SOMEWHAT DIFFICULT
SUM OF ALL RESPONSES TO PHQ QUESTIONS 1-9: 16
SUM OF ALL RESPONSES TO PHQ QUESTIONS 1-9: 16

## 2023-04-18 ASSESSMENT — PAIN SCALES - GENERAL: PAINLEVEL: NO PAIN (0)

## 2023-04-18 NOTE — PROGRESS NOTES
SUBJECTIVE:   CC: Candelario is an 42 year old who presents for preventive health visit.        View : No data to display.            Patient has been advised of split billing requirements and indicates understanding: Yes  Healthy Habits:     Getting at least 3 servings of Calcium per day:  Yes    Bi-annual eye exam:  NO    Dental care twice a year:  NO    Sleep apnea or symptoms of sleep apnea:  Daytime drowsiness    Diet:  Other    Frequency of exercise:  2-3 days/week    Duration of exercise:  N/A    Taking medications regularly:  Not Applicable    Medication side effects:  Other    PHQ-2 Total Score: 2    Additional concerns today:  No    Cramping between periods, bloating, nauseous between periods. Heavier period with more clots. Random spotting.    Today's PHQ-2 Score:       4/18/2023     7:53 AM   PHQ-2 ( 1999 [employer])   Q1: Little interest or pleasure in doing things 1   Q2: Feeling down, depressed or hopeless 1   PHQ-2 Score 2   Q1: Little interest or pleasure in doing things Several days   Q2: Feeling down, depressed or hopeless Several days   PHQ-2 Score 2       Have you ever done Advance Care Planning? (For example, a Health Directive, POLST, or a discussion with a medical provider or your loved ones about your wishes): No, advance care planning information given to patient to review.  Advanced care planning was discussed at today's visit.    Social History     Tobacco Use     Smoking status: Every Day     Packs/day: 0.50     Years: 13.00     Pack years: 6.50     Types: Cigarettes     Smokeless tobacco: Never     Tobacco comments:     Smoking History Packs/day: 0.50   Vaping Use     Vaping status: Never Used   Substance Use Topics     Alcohol use: Yes             4/18/2023     7:53 AM   Alcohol Use   Prescreen: >3 drinks/day or >7 drinks/week? Yes   AUDIT SCORE  12         4/18/2023     7:53 AM   AUDIT - Alcohol Use Disorders Identification Test - Reproduced from the World Health Organization Audit 2001  (Second Edition)   1.  How often do you have a drink containing alcohol? 2 to 3 times a week   2.  How many drinks containing alcohol do you have on a typical day when you are drinking? 7 to 9   3.  How often do you have five or more drinks on one occasion? Weekly   4.  How often during the last year have you found that you were not able to stop drinking once you had started? Weekly   5.  How often during the last year have you failed to do what was normally expected of you because of drinking? Never   6.  How often during the last year have you needed a first drink in the morning to get yourself going after a heavy drinking session? Never   7.  How often during the last year have you had a feeling of guilt or remorse after drinking? Never   8.  How often during the last year have you been unable to remember what happened the night before because of your drinking? Never   9.  Have you or someone else been injured because of your drinking? No   10. Has a relative, friend, doctor or other health care worker been concerned about your drinking or suggested you cut down? No   TOTAL SCORE 12     Reviewed orders with patient.  Reviewed health maintenance and updated orders accordingly - Yes  BP Readings from Last 3 Encounters:   23 122/72   09/15/21 118/76   21 118/56    Wt Readings from Last 3 Encounters:   23 81 kg (178 lb 9.6 oz)   09/15/21 81.3 kg (179 lb 3.2 oz)   21 77.6 kg (171 lb)                    Breast Cancer Screenin/18/2023     7:53 AM   Breast CA Risk Assessment (FHS-7)   Do you have a family history of breast, colon, or ovarian cancer? No / Unknown         Mammogram Screening - Offered annual screening and updated Health Maintenance for mutual plan based on risk factor consideration    Pertinent mammograms are reviewed under the imaging tab.    History of abnormal Pap smear: NO - age 30-65 PAP every 5 years with negative HPV co-testing recommended      Latest Ref Rng &  "Units 4/4/2019     8:33 AM 4/4/2019     8:01 AM 4/25/2012     2:49 PM   PAP / HPV   PAP (Historical)  NIL    NIL     HPV 16 DNA NEG^Negative  Negative      HPV 18 DNA NEG^Negative  Negative      Other HR HPV NEG^Negative  Negative        Reviewed and updated as needed this visit by clinical staff   Tobacco  Allergies  Meds              Reviewed and updated as needed this visit by Provider                     Review of Systems   Cardiovascular: Positive for peripheral edema.   Gastrointestinal: Positive for abdominal pain, heartburn and nausea.   Breasts:  Positive for tenderness.   Genitourinary: Positive for frequency, pelvic pain and vaginal bleeding.   Musculoskeletal: Positive for arthralgias.   Psychiatric/Behavioral: The patient is nervous/anxious.           OBJECTIVE:   /72 (BP Location: Right arm, Patient Position: Sitting, Cuff Size: Adult Regular)   Pulse 96   Temp 98.2  F (36.8  C) (Tympanic)   Resp 16   Ht 1.594 m (5' 2.75\")   Wt 81 kg (178 lb 9.6 oz)   LMP 03/27/2023 (Approximate)   SpO2 100%   BMI 31.89 kg/m    Physical Exam  GENERAL: healthy, alert and no distress  EYES: Eyes grossly normal to inspection, PERRL and conjunctivae and sclerae normal  HENT: ear canals and TM's normal, nose and mouth without ulcers or lesions  NECK: no adenopathy, no asymmetry, masses, or scars and thyroid normal to palpation  RESP: lungs clear to auscultation - no rales, rhonchi or wheezes  BREAST: normal without masses, tenderness or nipple discharge and no palpable axillary masses or adenopathy  CV: regular rate and rhythm, normal S1 S2, no S3 or S4, no murmur, click or rub, no peripheral edema and peripheral pulses strong  ABDOMEN: soft, nontender, no hepatosplenomegaly, no masses and bowel sounds normal   (female): normal female external genitalia, normal urethral meatus, vaginal mucosa pink, moist, well rugated, and normal cervix without masses or discharge  MS: no gross musculoskeletal defects " "noted, no edema  SKIN: no suspicious lesions or rashes  NEURO: Normal strength and tone, mentation intact and speech normal  PSYCH: mentation appears normal, affect normal/bright    Diagnostic Test Results:  Labs reviewed in Epic    ASSESSMENT/PLAN:   Candelario was seen today for physical.    Diagnoses and all orders for this visit:    Routine general medical examination at a health care facility    Need for hepatitis C screening test  -     Hepatitis C Screen Reflex to HCV RNA Quant and Genotype; Future    Cervical cancer screening  -     Pap Screen with HPV - recommended age 30 - 65 years    Mild episode of recurrent major depressive disorder (H): mild, off medication (medication caused no emotion side effects) going well, no concerns    DUB (dysfunctional uterine bleeding): has smal fibroids 2 years ago  Rule out thyroid and check anemia levels  Recheck pelvic US  Likely next step OBGYN.  -     TSH with free T4 reflex; Future  -     US Pelvic Complete with Transvaginal; Future  -     Hemoglobin; Future  -     Ferritin; Future    Encounter for screening mammogram for breast cancer  -     MA Screen Bilateral w/Federico; Future    Iron deficiency anemia due to chronic blood loss  -     Iron and iron binding capacity; Future    Other orders  -     REVIEW OF HEALTH MAINTENANCE PROTOCOL ORDERS        Patient has been advised of split billing requirements and indicates understanding: Yes      COUNSELING:  Reviewed preventive health counseling, as reflected in patient instructions       Regular exercise       Healthy diet/nutrition       Vision screening       (Autumn)menopause management      BMI:   Estimated body mass index is 31.89 kg/m  as calculated from the following:    Height as of this encounter: 1.594 m (5' 2.75\").    Weight as of this encounter: 81 kg (178 lb 9.6 oz).   Weight management plan: Discussed healthy diet and exercise guidelines  Wt Readings from Last 4 Encounters:   04/18/23 81 kg (178 lb 9.6 oz)   09/15/21 " 81.3 kg (179 lb 3.2 oz)   04/21/21 77.6 kg (171 lb)   03/05/21 83.5 kg (184 lb)         She reports that she has been smoking cigarettes. She has a 6.50 pack-year smoking history. She has never used smokeless tobacco.  Nicotine/Tobacco Cessation Plan:   Information offered: Patient not interested at this time    Jus García MD  [facility]  Answers for HPI/ROS submitted by the patient on 4/18/2023  If you checked off any problems, how difficult have these problems made it for you to do your work, take care of things at home, or get along with other people?: Somewhat difficult  PHQ9 TOTAL SCORE: 16

## 2023-04-18 NOTE — LETTER
April 20, 2023      Candelario J Juan Ramon  [address]   [place] [address]        Dear ,    We are writing to inform you of your test results.    Normal result(s).   If you have questions or concerns please let me know.    Resulted Orders   Iron and iron binding capacity   Result Value Ref Range    Iron 15 (L) 37 - 145 ug/dL    Iron Binding Capacity 511 (H) 240 - 430 ug/dL    Iron Sat Index 3 (L) 15 - 46 %   Ferritin   Result Value Ref Range    Ferritin 5 (L) 6 - 175 ng/mL   Hemoglobin   Result Value Ref Range    Hemoglobin 10.7 (L) 11.7 - 15.7 g/dL   TSH with free T4 reflex   Result Value Ref Range    TSH 3.83 0.30 - 4.20 uIU/mL   Hepatitis C Screen Reflex to HCV RNA Quant and Genotype   Result Value Ref Range    Hepatitis C Antibody Nonreactive Nonreactive    Narrative    Assay performance characteristics have not been established for newborns, infants, and children.       If you have any questions or concerns, please call the clinic at the number listed above.       Sincerely,      Jus García MD

## 2023-04-18 NOTE — LETTER
April 19, 2023      Candelario HARRISON Juan Ramon  [address]   [place] [address]        Dear ,    We are writing to inform you of your test results.    Mild anemia with low hemoglobin and Iron is low and ferritin (iron stores) are low too.  So do take the iron supplement daily (try a gummy).   Normal thyroid TSH test.    Resulted Orders   Iron and iron binding capacity   Result Value Ref Range    Iron 15 (L) 37 - 145 ug/dL    Iron Binding Capacity 511 (H) 240 - 430 ug/dL    Iron Sat Index 3 (L) 15 - 46 %   Ferritin   Result Value Ref Range    Ferritin 5 (L) 6 - 175 ng/mL   Hemoglobin   Result Value Ref Range    Hemoglobin 10.7 (L) 11.7 - 15.7 g/dL   TSH with free T4 reflex   Result Value Ref Range    TSH 3.83 0.30 - 4.20 uIU/mL       If you have any questions or concerns, please call the clinic at the number listed above.       Sincerely,      Jus García MD

## 2023-04-18 NOTE — PATIENT INSTRUCTIONS
To lab  To schedule your mammogram and pelvic ultrasound imaging, please call [phone] for [facility] and [facility]    Preventive Health Recommendations  Female Ages 40 to 49    Yearly exam:   See your health care provider every year in order to  Review health changes.   Discuss preventive care.    Review your medicines if your doctor prescribed any.    Get a Pap test every three years (unless you have an abnormal result and your provider advises testing more often).    If you get Pap tests with HPV test, you only need to test every 5 years, unless you have an abnormal result. You do not need a Pap test if your uterus was removed (hysterectomy) and you have not had cancer.    You should be tested each year for STDs (sexually transmitted diseases), if you're at risk.   Ask your doctor if you should have a mammogram.    Have a colonoscopy (test for colon cancer) if someone in your family has had colon cancer or polyps before age 50.     Have a cholesterol test every 5 years.     Have a diabetes test (fasting glucose) after age 45. If you are at risk for diabetes, you should have this test every 3 years.    Shots: Get a flu shot each year. Get a tetanus shot every 10 years.     Nutrition:   Eat at least 5 servings of fruits and vegetables each day.  Eat whole-grain bread, whole-wheat pasta and brown rice instead of white grains and rice.  Get adequate Calcium and Vitamin D.      Lifestyle  Exercise at least 150 minutes a week (an average of 30 minutes a day, 5 days a week). This will help you control your weight and prevent disease.  Limit alcohol to one drink per day.  No smoking.   Wear sunscreen to prevent skin cancer.  See your dentist every six months for an exam and cleaning.    IBS (irritable bowel syndrome):  This is a bothersome lifelong (chronic) condition that affects the large intestine.  It commonly causes gas, bloating, cramping, diarrhea and constipation.  It is a bothersome, but  not serious problem, it does not cause changes to bowel tissue.  It does not increase colon cancer risk.  Non-medication changes: exercise, stress reduction and low FODMAPs diet    [email or web address]    Elimination diet for low FODMAPs (Fermentable Oligo-Di-Monosaccharides and Polyols) to do for 6 weeks, if symptoms improve then add back one food every 3 days.  If no improvement with elimination diet then can go back to regular diet at the end of 6 weeks.  Avoid high gas foods like raw veggies or high fructose corn syrup.

## 2023-04-20 LAB
BKR LAB AP GYN ADEQUACY: NORMAL
BKR LAB AP GYN INTERPRETATION: NORMAL
BKR LAB AP HPV REFLEX: NORMAL
BKR LAB AP LMP: NORMAL
BKR LAB AP PREVIOUS ABNORMAL: NORMAL
PATH REPORT.COMMENTS IMP SPEC: NORMAL
PATH REPORT.COMMENTS IMP SPEC: NORMAL
PATH REPORT.RELEVANT HX SPEC: NORMAL

## 2023-04-24 LAB
HUMAN PAPILLOMA VIRUS 16 DNA: NEGATIVE
HUMAN PAPILLOMA VIRUS 18 DNA: NEGATIVE
HUMAN PAPILLOMA VIRUS FINAL DIAGNOSIS: NORMAL
HUMAN PAPILLOMA VIRUS OTHER HR: NEGATIVE

## 2023-08-02 ENCOUNTER — APPOINTMENT (OUTPATIENT)
Dept: GENERAL RADIOLOGY | Facility: CLINIC | Age: 43
End: 2023-08-02
Payer: COMMERCIAL

## 2023-08-02 ENCOUNTER — HOSPITAL ENCOUNTER (EMERGENCY)
Facility: CLINIC | Age: 43
Discharge: HOME | End: 2023-08-02
Payer: COMMERCIAL

## 2023-08-02 VITALS
DIASTOLIC BLOOD PRESSURE: 74 MMHG | HEART RATE: 95 BPM | OXYGEN SATURATION: 100 % | RESPIRATION RATE: 16 BRPM | SYSTOLIC BLOOD PRESSURE: 94 MMHG | TEMPERATURE: 99.8 F

## 2023-08-02 DIAGNOSIS — S62.647A CLOSED NONDISPLACED FRACTURE OF PROXIMAL PHALANX OF LEFT LITTLE FINGER, INITIAL ENCOUNTER: ICD-10-CM

## 2023-08-02 PROCEDURE — 73140 X-RAY EXAM OF FINGER(S): CPT | Mod: LT

## 2023-08-02 PROCEDURE — 26720 TREAT FINGER FRACTURE EACH: CPT | Mod: 54

## 2023-08-02 PROCEDURE — 99214 OFFICE O/P EST MOD 30 MIN: CPT | Mod: 57

## 2023-08-02 PROCEDURE — G0463 HOSPITAL OUTPT CLINIC VISIT: HCPCS | Mod: 25

## 2023-08-02 PROCEDURE — 26720 TREAT FINGER FRACTURE EACH: CPT | Mod: F4

## 2023-08-02 ASSESSMENT — ACTIVITIES OF DAILY LIVING (ADL): ADLS_ACUITY_SCORE: 35

## 2023-08-02 ASSESSMENT — ENCOUNTER SYMPTOMS: ARTHRALGIAS: 1

## 2023-08-02 NOTE — ED PROVIDER NOTES
History     Chief Complaint   Patient presents with    Hand Pain     Hand was slammed in the car door about 3 weeks ago. Left pinkie still bruised, swollen and painful.      HPI  Candelario Delatorre is a 42 year old female who presents to urgent care for concern of left fifth finger pain.  Patient states that she had her left hand slammed in a car door approximately 3 weeks ago.  Patient states she continues to have pain and decreased range of motion in the left fifth finger.  States the remainder of her hand seems to be improving, however she has not had much improvement in the fifth finger.  Patient has been taz taping her fourth and fifth fingers on the left.  She has not been taking any over-the-counter oral medications for pain.  She also has not been icing the area.  She has no other new concerns at this time.    Allergies:  No Known Allergies    Problem List:    Patient Active Problem List    Diagnosis Date Noted    Mild episode of recurrent major depressive disorder (H) 05/21/2019     Priority: Medium    Gastroesophageal reflux disease without esophagitis 05/21/2019     Priority: Medium    Anxiety 05/31/2017     Priority: Medium    Tobacco use disorder 05/10/2010     Priority: Medium    Seasonal allergies 05/10/2010     Priority: Medium        Past Medical History:    No past medical history on file.    Past Surgical History:    No past surgical history on file.    Family History:    Family History   Problem Relation Age of Onset    Heart Disease Paternal Grandfather         Heart Disease    Diabetes Maternal Aunt         Diabetes    Diabetes Maternal Uncle         Diabetes    Melanoma No family hx of        Social History:  Marital Status:   [2]  Social History     Tobacco Use    Smoking status: Every Day     Packs/day: 0.50     Years: 13.00     Pack years: 6.50     Types: Cigarettes    Smokeless tobacco: Never    Tobacco comments:     Smoking History Packs/day: 0.50   Vaping Use    Vaping Use: Never  used   Substance Use Topics    Alcohol use: Yes    Drug use: Unknown     Types: Other     Comment: Drug use: No        Medications:    omeprazole (PRILOSEC) 20 MG DR capsule          Review of Systems   Musculoskeletal:  Positive for arthralgias (Left fifth finger pain following injury 3 weeks ago.).       Physical Exam   BP: 94/74  Pulse: 95  Temp: 99.8  F (37.7  C)  Resp: 16  SpO2: 100 %      Physical Exam  Constitutional:       General: She is not in acute distress.     Appearance: Normal appearance. She is well-developed.   HENT:      Head: Normocephalic and atraumatic.      Mouth/Throat:      Mouth: Mucous membranes are moist.   Eyes:      General: No scleral icterus.     Conjunctiva/sclera: Conjunctivae normal.   Cardiovascular:      Rate and Rhythm: Normal rate.   Pulmonary:      Effort: Pulmonary effort is normal. No respiratory distress.   Abdominal:      General: Abdomen is flat.   Musculoskeletal:      Right hand: Normal.      Left hand: Swelling (Fifth digit), tenderness and bony tenderness present. Decreased range of motion (Left fifth digit.). Decreased strength of finger abduction. Normal capillary refill. Normal pulse.      Cervical back: Normal range of motion and neck supple.   Skin:     General: Skin is warm and dry.      Capillary Refill: Capillary refill takes less than 2 seconds.      Findings: No rash.   Neurological:      Mental Status: She is alert and oriented to person, place, and time.         ED Course                 Procedures         Results for orders placed or performed during the hospital encounter of 08/02/23 (from the past 24 hour(s))   Fingers XR, 2-3 views, left    Narrative    XR FINGER LEFT G/E 2 VIEWS 8/2/2023 3:55 PM     HISTORY: crush injury a few days ago. Pain.    COMPARISON: None.       Impression    IMPRESSION:    There is a comminuted nondisplaced fracture of the fifth digit  proximal phalanx. No definite intra-articular extension.    NOTE: ABNORMAL REPORT    THE  DICTATION ABOVE DESCRIBES AN ABNORMAL REPORT FOR WHICH FOLLOW-UP  IS NEEDED.    KIRILL CURRY MD         SYSTEM ID:  [id number]       Medications - No data to display    Assessments & Plan (with Medical Decision Making)   Patient presented to urgent care for concern of pain in the left fifth finger following an injury 3 weeks ago.  She is afebrile on arrival and vital signs otherwise reassuring.  X-rays completed of the fifth digit on the left show a comminuted nondisplaced fracture in the proximal phalanx.  Patient was provided with a finger splint and the fourth and fifth fingers on the left were taz taped.  Referral for orthopedics was placed for follow-up next week.  Symptomatic pain treatments were discussed such as Tylenol and ibuprofen as well as ice and elevation.  Recommended return in the meantime for any new or worsening symptoms.  She was discharged in good condition and is agreeable to a plan.  Handouts provided on discharge.    I have reviewed the nursing notes.    I have reviewed the findings, diagnosis, plan and need for follow up with the patient.        New Prescriptions    No medications on file       Final diagnoses:   Closed nondisplaced fracture of proximal phalanx of left little finger, initial encounter       8/2/2023   [facility] EMERGENCY DEPT    Disclaimer:  This note consists of symbols derived from keyboarding, dictation and/or voice recognition software.  As a result, there may be errors in the script that have gone undetected.  Please consider this when interpreting information found in this chart.         Chele Schwab APRN CNP  08/02/23 [id number]

## 2023-08-02 NOTE — DISCHARGE INSTRUCTIONS
Clarence taping and splint of the finger. Follow-up with ortho next week. In the meantime, rest, tylenol, and ice. Return for new concerns.

## 2023-10-06 ENCOUNTER — PATIENT OUTREACH (OUTPATIENT)
Dept: CARE COORDINATION | Facility: CLINIC | Age: 43
End: 2023-10-06

## 2023-10-06 NOTE — PROGRESS NOTES
Clinic Care Coordination Contact  Program:  [facility] [place]   Renewal: [facility]   Date Applied:     ERICA Outreach:   10/6/23: 1st outreach attempt. Left a message on voicemail with call back information and requested return call.  Plan: CTA will call again within 2 weeks.  Fadumo Hallman  Care   [facility]  Clinic Care Coordination  [phone]      Health Insurance:      Referral/Screening:

## 2024-03-19 ENCOUNTER — PATIENT OUTREACH (OUTPATIENT)
Dept: CARE COORDINATION | Facility: CLINIC | Age: 44
End: 2024-03-19

## 2024-03-23 ENCOUNTER — HEALTH MAINTENANCE LETTER (OUTPATIENT)
Age: 44
End: 2024-03-23

## 2024-04-02 ENCOUNTER — PATIENT OUTREACH (OUTPATIENT)
Dept: CARE COORDINATION | Facility: CLINIC | Age: 44
End: 2024-04-02

## 2024-06-01 ENCOUNTER — HEALTH MAINTENANCE LETTER (OUTPATIENT)
Age: 44
End: 2024-06-01

## 2024-09-03 ENCOUNTER — OFFICE VISIT (OUTPATIENT)
Dept: FAMILY MEDICINE | Facility: CLINIC | Age: 44
End: 2024-09-03
Payer: COMMERCIAL

## 2024-09-03 VITALS
HEART RATE: 90 BPM | TEMPERATURE: 98.5 F | SYSTOLIC BLOOD PRESSURE: 114 MMHG | DIASTOLIC BLOOD PRESSURE: 72 MMHG | BODY MASS INDEX: 30.55 KG/M2 | RESPIRATION RATE: 16 BRPM | OXYGEN SATURATION: 99 % | WEIGHT: 172.4 LBS | HEIGHT: 63 IN

## 2024-09-03 DIAGNOSIS — Z87.898 HISTORY OF LUMP OF RIGHT BREAST: ICD-10-CM

## 2024-09-03 DIAGNOSIS — Z12.31 VISIT FOR SCREENING MAMMOGRAM: ICD-10-CM

## 2024-09-03 PROCEDURE — 99213 OFFICE O/P EST LOW 20 MIN: CPT

## 2024-09-03 ASSESSMENT — ANXIETY QUESTIONNAIRES
8. IF YOU CHECKED OFF ANY PROBLEMS, HOW DIFFICULT HAVE THESE MADE IT FOR YOU TO DO YOUR WORK, TAKE CARE OF THINGS AT HOME, OR GET ALONG WITH OTHER PEOPLE?: NOT DIFFICULT AT ALL
GAD7 TOTAL SCORE: 0
7. FEELING AFRAID AS IF SOMETHING AWFUL MIGHT HAPPEN: NOT AT ALL

## 2024-09-03 ASSESSMENT — PATIENT HEALTH QUESTIONNAIRE - PHQ9
10. IF YOU CHECKED OFF ANY PROBLEMS, HOW DIFFICULT HAVE THESE PROBLEMS MADE IT FOR YOU TO DO YOUR WORK, TAKE CARE OF THINGS AT HOME, OR GET ALONG WITH OTHER PEOPLE: NOT DIFFICULT AT ALL
SUM OF ALL RESPONSES TO PHQ QUESTIONS 1-9: 0
SUM OF ALL RESPONSES TO PHQ QUESTIONS 1-9: 0

## 2024-09-03 ASSESSMENT — PAIN SCALES - GENERAL: PAINLEVEL: NO PAIN (0)

## 2024-09-03 NOTE — PROGRESS NOTES
"  Assessment & Plan     History of lump of right breast  Patient reports she had a lump on her breast one month ago approximately 2 cm starting on her nipple of her right breast descending approximately 5 cm. Lump is not palpable at today's visit. No abnormalities can be palpated in right or left breast or right or left auxiliary lymph nodes. Patient reports breast can have clear discharge is squeezed at certain times. No discharge during today's visit. No lesions, discoloration, or reports of injury to right or left breast. Patient reported new history of two maternal aunt's that have  having breast cancer. Patient is unsure of mother's health history. Discussed breast cancer gene screening in the near future when patient return for annual exam.     - MA Screening Bilateral w/ Federico; Future    Visit for screening mammogram  See note above.    - MA Screening Bilateral w/ Federico; Future          Nicotine/Tobacco Cessation  She reports that she has been smoking cigarettes. She has a 6.5 pack-year smoking history. She has never used smokeless tobacco.  Nicotine/Tobacco Cessation Plan  Information offered: Patient not interested at this time      BMI  Estimated body mass index is 30.78 kg/m  as calculated from the following:    Height as of this encounter: 1.594 m (5' 2.76\").    Weight as of this encounter: 78.2 kg (172 lb 6.4 oz).     SELF MONITORING:       - Watch for any lumps in breasts.     Subjective   Candelario is a 43 year old, presenting for the following health issues:  Mass (In right breast, x 1 month - disappeared after 4 days )        9/3/2024     1:02 PM   Additional Questions   Roomed by Gina Arroyo  The problem has been resolved.   History of Present Illness       Reason for visit:  Pap & breast overall exam    She eats 2-3 servings of fruits and vegetables daily.She consumes 1 sweetened beverage(s) daily.She exercises with enough effort to increase her heart rate 9 or less minutes per day.  She " "exercises with enough effort to increase her heart rate 3 or less days per week.   She is taking medications regularly.     Review of Systems  CONSTITUTIONAL: NEGATIVE for fever, chills, change in weight  ENT/MOUTH: NEGATIVE for ear, mouth and throat problems  RESP: NEGATIVE for significant cough or SOB  CV: NEGATIVE for chest pain, palpitations or peripheral edema      Objective    /72 (BP Location: Right arm, Patient Position: Sitting, Cuff Size: Adult Regular)   Pulse 90   Temp 98.5  F (36.9  C) (Tympanic)   Resp 16   Ht 1.594 m (5' 2.76\")   Wt 78.2 kg (172 lb 6.4 oz)   LMP 08/20/2024 (Approximate)   SpO2 99%   BMI 30.78 kg/m    Body mass index is 30.78 kg/m .  Physical Exam   GENERAL: alert and no distress  NECK: no adenopathy, no asymmetry, masses, or scars  RESP: lungs clear to auscultation - no rales, rhonchi or wheezes  CV: regular rate and rhythm, normal S1 S2, no S3 or S4, no murmur, click or rub, no peripheral edema  MS: no gross musculoskeletal defects noted, no edema        Signed Electronically by: LEANNA Fuentes CNP    "

## 2024-09-24 ENCOUNTER — OFFICE VISIT (OUTPATIENT)
Dept: FAMILY MEDICINE | Facility: CLINIC | Age: 44
End: 2024-09-24
Payer: COMMERCIAL

## 2024-09-24 VITALS
DIASTOLIC BLOOD PRESSURE: 76 MMHG | BODY MASS INDEX: 31.17 KG/M2 | RESPIRATION RATE: 16 BRPM | HEART RATE: 91 BPM | SYSTOLIC BLOOD PRESSURE: 111 MMHG | TEMPERATURE: 97.9 F | OXYGEN SATURATION: 97 % | WEIGHT: 175.9 LBS | HEIGHT: 63 IN

## 2024-09-24 DIAGNOSIS — Z00.00 ROUTINE GENERAL MEDICAL EXAMINATION AT A HEALTH CARE FACILITY: Primary | ICD-10-CM

## 2024-09-24 DIAGNOSIS — Z13.29 SCREENING FOR THYROID DISORDER: ICD-10-CM

## 2024-09-24 DIAGNOSIS — Z13.220 SCREENING FOR HYPERLIPIDEMIA: ICD-10-CM

## 2024-09-24 DIAGNOSIS — Z13.1 SCREENING FOR DIABETES MELLITUS: ICD-10-CM

## 2024-09-24 DIAGNOSIS — Z13.0 SCREENING FOR DEFICIENCY ANEMIA: ICD-10-CM

## 2024-09-24 DIAGNOSIS — F41.9 ANXIETY: ICD-10-CM

## 2024-09-24 LAB
ALBUMIN SERPL BCG-MCNC: 4.1 G/DL (ref 3.5–5.2)
ALP SERPL-CCNC: 90 U/L (ref 40–150)
ALT SERPL W P-5'-P-CCNC: 25 U/L (ref 0–50)
ANION GAP SERPL CALCULATED.3IONS-SCNC: 9 MMOL/L (ref 7–15)
AST SERPL W P-5'-P-CCNC: 29 U/L (ref 0–45)
BASOPHILS # BLD AUTO: 0.1 10E3/UL (ref 0–0.2)
BASOPHILS NFR BLD AUTO: 1 %
BILIRUB SERPL-MCNC: <0.2 MG/DL
BUN SERPL-MCNC: 9.3 MG/DL (ref 6–20)
CALCIUM SERPL-MCNC: 9.6 MG/DL (ref 8.8–10.4)
CHLORIDE SERPL-SCNC: 100 MMOL/L (ref 98–107)
CHOLEST SERPL-MCNC: 232 MG/DL
CREAT SERPL-MCNC: 0.63 MG/DL (ref 0.51–0.95)
EGFRCR SERPLBLD CKD-EPI 2021: >90 ML/MIN/1.73M2
EOSINOPHIL # BLD AUTO: 0.1 10E3/UL (ref 0–0.7)
EOSINOPHIL NFR BLD AUTO: 1 %
ERYTHROCYTE [DISTWIDTH] IN BLOOD BY AUTOMATED COUNT: 16.1 % (ref 10–15)
FASTING STATUS PATIENT QL REPORTED: NO
FASTING STATUS PATIENT QL REPORTED: NO
GLUCOSE SERPL-MCNC: 96 MG/DL (ref 70–99)
HCO3 SERPL-SCNC: 26 MMOL/L (ref 22–29)
HCT VFR BLD AUTO: 35.2 % (ref 35–47)
HDLC SERPL-MCNC: 55 MG/DL
HGB BLD-MCNC: 10.3 G/DL (ref 11.7–15.7)
IMM GRANULOCYTES # BLD: 0 10E3/UL
IMM GRANULOCYTES NFR BLD: 0 %
LDLC SERPL CALC-MCNC: 134 MG/DL
LYMPHOCYTES # BLD AUTO: 2.8 10E3/UL (ref 0.8–5.3)
LYMPHOCYTES NFR BLD AUTO: 26 %
MCH RBC QN AUTO: 22.4 PG (ref 26.5–33)
MCHC RBC AUTO-ENTMCNC: 29.3 G/DL (ref 31.5–36.5)
MCV RBC AUTO: 77 FL (ref 78–100)
MONOCYTES # BLD AUTO: 0.8 10E3/UL (ref 0–1.3)
MONOCYTES NFR BLD AUTO: 7 %
NEUTROPHILS # BLD AUTO: 7 10E3/UL (ref 1.6–8.3)
NEUTROPHILS NFR BLD AUTO: 65 %
NONHDLC SERPL-MCNC: 177 MG/DL
PLATELET # BLD AUTO: 260 10E3/UL (ref 150–450)
POTASSIUM SERPL-SCNC: 4.3 MMOL/L (ref 3.4–5.3)
PROT SERPL-MCNC: 7.4 G/DL (ref 6.4–8.3)
RBC # BLD AUTO: 4.6 10E6/UL (ref 3.8–5.2)
SODIUM SERPL-SCNC: 135 MMOL/L (ref 135–145)
TRIGL SERPL-MCNC: 217 MG/DL
TSH SERPL DL<=0.005 MIU/L-ACNC: 2.42 UIU/ML (ref 0.3–4.2)
WBC # BLD AUTO: 10.7 10E3/UL (ref 4–11)

## 2024-09-24 PROCEDURE — 96127 BRIEF EMOTIONAL/BEHAV ASSMT: CPT

## 2024-09-24 PROCEDURE — 84443 ASSAY THYROID STIM HORMONE: CPT

## 2024-09-24 PROCEDURE — 99213 OFFICE O/P EST LOW 20 MIN: CPT | Mod: 25

## 2024-09-24 PROCEDURE — 99396 PREV VISIT EST AGE 40-64: CPT

## 2024-09-24 PROCEDURE — 36415 COLL VENOUS BLD VENIPUNCTURE: CPT

## 2024-09-24 PROCEDURE — 80061 LIPID PANEL: CPT

## 2024-09-24 PROCEDURE — 80053 COMPREHEN METABOLIC PANEL: CPT

## 2024-09-24 PROCEDURE — 85025 COMPLETE CBC W/AUTO DIFF WBC: CPT

## 2024-09-24 RX ORDER — HYDROXYZINE HYDROCHLORIDE 25 MG/1
25 TABLET, FILM COATED ORAL 3 TIMES DAILY PRN
Qty: 30 TABLET | Refills: 0 | Status: SHIPPED | OUTPATIENT
Start: 2024-09-24

## 2024-09-24 ASSESSMENT — PAIN SCALES - GENERAL: PAINLEVEL: NO PAIN (0)

## 2024-09-24 NOTE — PROGRESS NOTES
"Preventive Care Visit  [facility]  LEANNA Fuentes CNP, Nurse Practitioner Primary Care  Sep 24, 2024      Assessment & Plan     Routine general medical examination at a health care facility  Patient is in overall general good health.     - REVIEW OF HEALTH MAINTENANCE PROTOCOL ORDERS    Anxiety  Patient reports a long history dealing with anxiety, has tried Lexapro in the past and has not had success with it. Trying PRN Hydroxyzine, patient education regarding appropriate administration for anxiety. Patient education regarding if she is taking Hydroxyzine every day or 5 days a week to schedule clinic appointment to discuss another anxiety medication and method.     - hydrOXYzine HCl (ATARAX) 25 MG tablet; Take 1 tablet (25 mg) by mouth 3 times daily as needed for itching or anxiety.    Screening for diabetes mellitus    - Comprehensive metabolic panel (BMP + Alb, Alk Phos, ALT, AST, Total. Bili, TP); Future    Screening for hyperlipidemia    - Lipid panel reflex to direct LDL Non-fasting; Future    Screening for deficiency anemia  History of anemia.    - CBC with platelets and differential; Future    Screening for thyroid disorder    - TSH with free T4 reflex; Future    Patient has been advised of split billing requirements and indicates understanding: Yes        BMI  Estimated body mass index is 31.36 kg/m  as calculated from the following:    Height as of this encounter: 1.595 m (5' 2.8\").    Weight as of this encounter: 79.8 kg (175 lb 14.4 oz).   Weight management plan: Discussed healthy diet and exercise guidelines    Counseling  Appropriate preventive services were addressed with this patient via screening, questionnaire, or discussion as appropriate for fall prevention, nutrition, physical activity, Tobacco-use cessation, social engagement, weight loss and cognition.  Checklist reviewing preventive services available has been given to the patient.  Reviewed patient's " diet, addressing concerns and/or questions.   The patient was instructed to see the dentist every 6 months.   The patient reports drinking more than 3 alcoholic drinks per day and/or more than 7 drhnks per week. The patient was counseled and given information about possible harmful effects of excessive alcohol intake.    MEDICATIONS:  Continue current medications without change    Subjective   Candelario is a 43 year old, presenting for the following:  Annual Visit (Physical, not fasting )        9/24/2024    12:50 PM   Additional Questions   Roomed by Gina BEAN        Health Care Directive  Patient does not have a Health Care Directive or Living Will: Discussed advance care planning with patient; however, patient declined at this time.    atient reports a long history dealing with anxiety, has tried Lexapro in the past and has not had success with it. Trying PRN Hydroxyzine, patient education regarding appropriate administration for anxiety. Patient also reports occasional insomnia when she works Friday nights and Saturday mornings. Patient education regarding discussion of therapeutic modalities to try. Patient education regarding, if she is taking Hydroxyzine every day or 5 days a week to schedule clinic appointment to discuss another anxiety medication and method. Patient mentioned a history of uterine fibroids and cysts, patient encouraged to see ob/gyn for any further concerns of this type. Patient did not want referral at this time, as she had her own provider.     History of Present Illness       Reason for visit:  [facility]   She is taking medications regularly.    Physical, Not fasting     Annual Wellness Visit       Patient has been advised of split billing requirements and indicates understanding: Yes      Health Care Directive  Patient does not have a Health Care Directive or Living Will: Discussed advance care planning with patient; however, patient declined at this time.      9/24/2024   General Health   How  would you rate your overall physical health? (!) FAIR   Feel stress (tense, anxious, or unable to sleep) Very much             9/24/2024   Nutrition   Diet: Regular (no restrictions)            9/24/2024   Exercise   Days per week of moderate/strenous exercise 0 days   Average minutes spent exercising at this level 0 min        (!) EXERCISE CONCERN      9/24/2024   Social Factors   Frequency of gathering with friends or relatives Three times a week   Worry food won't last until get money to buy more No   Food not last or not have enough money for food? No   Do you have housing? (Housing is defined as stable permanent housing and does not include staying ouside in a car, in a tent, in an abandoned building, in an overnight shelter, or couch-surfing.) Yes   Are you worried about losing your housing? No   Lack of transportation? No   Unable to get utilities (heat,electricity)? No               No data to display                  9/24/2024   Dental   Dentist two times every year? (!) NO             No data to display                     No data to display                  9/24/2024   TB Screening   Were you born outside of the [place]? No          Social History     Tobacco Use    Smoking status: Every Day     Current packs/day: 0.50     Average packs/day: 0.5 packs/day for 13.0 years (6.5 ttl pk-yrs)     Types: Cigarettes    Smokeless tobacco: Never    Tobacco comments:     Smoking History Packs/day: 0.50   Vaping Use    Vaping status: Never Used   Substance Use Topics    Alcohol use: Yes    Drug use: Unknown     Types: Other     Comment: Drug use: No         Today's PHQ-9 Score:       9/3/2024    12:39 PM   PHQ-9 SCORE   PHQ-9 Total Score MyChart 0   PHQ-9 Total Score 0          Mammogram Screening - Mammogram every 1-2 years updated in Health Maintenance based on mutual decision making        9/24/2024   STI Screening   New sexual partner(s) since last STI/HIV test? No        History of abnormal Pap smear: No - age 30-64  HPV with reflex Pap every 5 years recommended        Latest Ref Rng & Units 4/18/2023     8:25 AM 4/4/2019     8:33 AM 4/4/2019     8:01 AM   PAP / HPV   PAP  Negative for Intraepithelial Lesion or Malignancy (NILM)      PAP (Historical)   NIL     HPV 16 DNA Negative Negative   Negative    HPV 18 DNA Negative Negative   Negative    Other HR HPV Negative Negative   Negative      ASCVD Risk   The ASCVD Risk score (Colin SOLIS, et al., 2019) failed to calculate for the following reasons:    Cannot find a previous HDL lab    Cannot find a previous total cholesterol lab        9/24/2024   Contraception/Family Planning   Questions about contraception or family planning No             Reviewed and updated as needed this visit by Provider     Meds  Problems               Lab work is in process    Current providers sharing in care for this patient include:  Patient Care Team:  Myriam Humphreys APRN CNP as PCP - General (Nurse Practitioner Primary Care)  Jus García MD as Assigned PCP    The following health maintenance items are reviewed in Epic and correct as of today:  Health Maintenance   Topic Date Due    MAMMO SCREENING  Never done    Pneumococcal Vaccine: Pediatrics (0 to 5 Years) and At-Risk Patients (6 to 64 Years) (1 of 2 - PCV) Never done    HEPATITIS B IMMUNIZATION (1 of 3 - 19+ 3-dose series) Never done    LIPID  Never done    GLUCOSE  01/31/2022    INFLUENZA VACCINE (1) 09/01/2024    COVID-19 Vaccine (1 - 2024-25 season) Never done    PHQ-9  03/03/2025    NICOTINE/TOBACCO CESSATION COUNSELING Q 1 YR  09/03/2025    YEARLY PREVENTIVE VISIT  09/24/2025    ANNUAL REVIEW OF HM ORDERS  09/24/2025    HPV TEST  04/18/2028    ADVANCE CARE PLANNING  04/18/2028    PAP  04/18/2028    DTAP/TDAP/TD IMMUNIZATION (3 - Td or Tdap) 04/05/2031    RSV VACCINE (1 - 1-dose 75+ series) 10/24/2056    HEPATITIS C SCREENING  Completed    HIV SCREENING  Completed    DEPRESSION ACTION PLAN  Completed    HPV  IMMUNIZATION  Aged Out    MENINGITIS IMMUNIZATION  Aged Out    RSV MONOCLONAL ANTIBODY  Aged Out       Appropriate preventive services were discussed with this patient, including applicable screening as appropriate for fall prevention, nutrition, physical activity, Tobacco-use cessation, weight loss and cognition.  Checklist reviewing preventive services available has been given to the patient.            9/24/2024   General Health   How would you rate your overall physical health? (!) FAIR   Feel stress (tense, anxious, or unable to sleep) Very much      (!) STRESS CONCERN      9/24/2024   Nutrition   Three or more servings of calcium each day? Yes   Diet: Regular (no restrictions)   How many servings of fruit and vegetables per day? (!) 2-3   How many sweetened beverages each day? 0-1            9/24/2024   Exercise   Days per week of moderate/strenous exercise 0 days   Average minutes spent exercising at this level 0 min      (!) EXERCISE CONCERN      9/24/2024   Social Factors   Frequency of gathering with friends or relatives Three times a week   Worry food won't last until get money to buy more No   Food not last or not have enough money for food? No   Do you have housing? (Housing is defined as stable permanent housing and does not include staying ouside in a car, in a tent, in an abandoned building, in an overnight shelter, or couch-surfing.) Yes   Are you worried about losing your housing? No   Lack of transportation? No   Unable to get utilities (heat,electricity)? No            9/24/2024   Dental   Dentist two times every year? (!) NO            9/24/2024   TB Screening   Were you born outside of the [place]? No                    9/24/2024   Substance Use   If I could quit smoking, I would Neutral   I want to quit somking, worry about health affects Neutral   Willing to make a plan to quit smoking Neutral   Willing to cut down before quitting Neutral   Alcohol more than 3/day or more than 7/wk Yes   How often  do you have a drink containing alcohol 2 to 4 times a month   How many alcohol drinks on typical day 5 or 6   How often do you have 5+ drinks at one occasion Monthly   Audit 2/3 Score 4   How often not able to stop drinking once started Never   How often failed to do what normally expected Never   How often needed first drink in am after a heavy drinking session Never   How often feeling of guilt or remorse after drinking Never   How often unable to remember what happened the night before Never   Have you or someone else been injured because of your drinking No   Has anyone been concerned or suggested you cut down on drinking No   TOTAL SCORE - AUDIT 6   Do you use any other substances recreationally? No        Social History     Tobacco Use    Smoking status: Every Day     Current packs/day: 0.50     Average packs/day: 0.5 packs/day for 13.0 years (6.5 ttl pk-yrs)     Types: Cigarettes    Smokeless tobacco: Never    Tobacco comments:     Smoking History Packs/day: 0.50   Vaping Use    Vaping status: Never Used   Substance Use Topics    Alcohol use: Yes    Drug use: Unknown     Types: Other     Comment: Drug use: No                  9/24/2024   STI Screening   New sexual partner(s) since last STI/HIV test? No        History of abnormal Pap smear: No        Latest Ref Rng & Units 4/18/2023     8:25 AM 4/4/2019     8:33 AM 4/4/2019     8:01 AM   PAP / HPV   PAP  Negative for Intraepithelial Lesion or Malignancy (NILM)      PAP (Historical)   NIL     HPV 16 DNA Negative Negative   Negative    HPV 18 DNA Negative Negative   Negative    Other HR HPV Negative Negative   Negative      ASCVD Risk   The ASCVD Risk score (Colin SOLIS, et al., 2019) failed to calculate for the following reasons:    Cannot find a previous HDL lab    Cannot find a previous total cholesterol lab        9/24/2024   Contraception/Family Planning   Questions about contraception or family planning No           Reviewed and updated as needed  "this visit by Provider                    Lab work is in process      Review of Systems  Constitutional, HEENT, cardiovascular, pulmonary, GI, , musculoskeletal, neuro, skin, endocrine and psych systems are negative, except as otherwise noted.     Objective    Exam  /76 (BP Location: Right arm, Patient Position: Sitting, Cuff Size: Adult Large)   Pulse 91   Temp 97.9  F (36.6  C) (Tympanic)   Resp 16   Ht 1.595 m (5' 2.8\")   Wt 79.8 kg (175 lb 14.4 oz)   LMP 09/09/2024 (Approximate)   SpO2 97%   BMI 31.36 kg/m     Estimated body mass index is 31.36 kg/m  as calculated from the following:    Height as of this encounter: 1.595 m (5' 2.8\").    Weight as of this encounter: 79.8 kg (175 lb 14.4 oz).    Physical Exam  GENERAL: alert and no distress  EYES: Eyes grossly normal to inspection, PERRL and conjunctivae and sclerae normal  HENT: ear canals and TM's normal, nose and mouth without ulcers or lesions  NECK: no adenopathy, no asymmetry, masses, or scars  RESP: lungs clear to auscultation - no rales, rhonchi or wheezes  CV: regular rate and rhythm, normal S1 S2, no S3 or S4, no murmur, click or rub, no peripheral edema  ABDOMEN: soft, nontender, no hepatosplenomegaly, no masses and bowel sounds normal  MS: no gross musculoskeletal defects noted, no edema  SKIN: no suspicious lesions or rashes  NEURO: Normal strength and tone, mentation intact and speech normal  PSYCH: mentation appears normal, affect normal/bright      Signed Electronically by: LEANNA Fuentes CNP    "

## 2024-09-24 NOTE — PATIENT INSTRUCTIONS
Patient Education   Preventive Care Advice   This is general advice given by our system to help you stay healthy. However, your care team may have specific advice just for you. Please talk to your care team about your preventive care needs.  Nutrition  Eat 5 or more servings of fruits and vegetables each day.  Try wheat bread, brown rice and whole grain pasta (instead of white bread, rice, and pasta).  Get enough calcium and vitamin D. Check the label on foods and aim for 100% of the RDA (recommended daily allowance).  Lifestyle  Exercise at least 150 minutes each week  (30 minutes a day, 5 days a week).  Do muscle strengthening activities 2 days a week. These help control your weight and prevent disease.  No smoking.  Wear sunscreen to prevent skin cancer.  Have a dental exam and cleaning every 6 months.  Yearly exams  See your health care team every year to talk about:  Any changes in your health.  Any medicines your care team has prescribed.  Preventive care, family planning, and ways to prevent chronic diseases.  Shots (vaccines)   HPV shots (up to age 26), if you've never had them before.  Hepatitis B shots (up to age 59), if you've never had them before.  COVID-19 shot: Get this shot when it's due.  Flu shot: Get a flu shot every year.  Tetanus shot: Get a tetanus shot every 10 years.  Pneumococcal, hepatitis A, and RSV shots: Ask your care team if you need these based on your risk.  Shingles shot (for age 50 and up)  General health tests  Diabetes screening:  Starting at age 35, Get screened for diabetes at least every 3 years.  If you are younger than age 35, ask your care team if you should be screened for diabetes.  Cholesterol test: At age 39, start having a cholesterol test every 5 years, or more often if advised.  Bone density scan (DEXA): At age 50, ask your care team if you should have this scan for osteoporosis (brittle bones).  Hepatitis C: Get tested at least once in your life.  STIs (sexually  transmitted infections)  Before age 24: Ask your care team if you should be screened for STIs.  After age 24: Get screened for STIs if you're at risk. You are at risk for STIs (including HIV) if:  You are sexually active with more than one person.  You don't use condoms every time.  You or a partner was diagnosed with a sexually transmitted infection.  If you are at risk for HIV, ask about PrEP medicine to prevent HIV.  Get tested for HIV at least once in your life, whether you are at risk for HIV or not.  Cancer screening tests  Cervical cancer screening: If you have a cervix, begin getting regular cervical cancer screening tests starting at age 21.  Breast cancer scan (mammogram): If you've ever had breasts, begin having regular mammograms starting at age 40. This is a scan to check for breast cancer.  Colon cancer screening: It is important to start screening for colon cancer at age 45.  Have a colonoscopy test every 10 years (or more often if you're at risk) Or, ask your provider about stool tests like a FIT test every year or Cologuard test every 3 years.  To learn more about your testing options, visit:   .  For help making a decision, visit:   https://bit.ly/wh37087.  Prostate cancer screening test: If you have a prostate, ask your care team if a prostate cancer screening test (PSA) at age 55 is right for you.  Lung cancer screening: If you are a current or former smoker ages 50 to 80, ask your care team if ongoing lung cancer screenings are right for you.  For informational purposes only. Not to replace the advice of your health care provider. Copyright   2023 [facility]. All rights reserved. Clinically reviewed by the [facility]itions [facility]. [employer] 564158 - REV 01/24.  Learning About Stress  What is stress?     Stress is your body's response to a hard situation. Your body can have a physical, emotional, or mental response. Stress is a fact of life for most people, and it  affects everyone differently. What causes stress for you may not be stressful for someone else.  A lot of things can cause stress. You may feel stress when you go on a job interview, take a test, or run a race. This kind of short-term stress is normal and even useful. It can help you if you need to work hard or react quickly. For example, stress can help you finish an important job on time.  Long-term stress is caused by ongoing stressful situations or events. Examples of long-term stress include long-term health problems, ongoing problems at work, or conflicts in your family. Long-term stress can harm your health.  How does stress affect your health?  When you are stressed, your body responds as though you are in danger. It makes hormones that speed up your heart, make you breathe faster, and give you a burst of energy. This is called the fight-or-flight stress response. If the stress is over quickly, your body goes back to normal and no harm is done.  But if stress happens too often or lasts too long, it can have bad effects. Long-term stress can make you more likely to get sick, and it can make symptoms of some diseases worse. If you tense up when you are stressed, you may develop neck, shoulder, or low back pain. Stress is linked to high blood pressure and heart disease.  Stress also harms your emotional health. It can make you fletcher, tense, or depressed. Your relationships may suffer, and you may not do well at work or school.  What can you do to manage stress?  You can try these things to help manage stress:   Do something active. Exercise or activity can help reduce stress. Walking is a great way to get started. Even everyday activities such as housecleaning or yard work can help.  Try yoga or kavitha chi. These techniques combine exercise and meditation. You may need some training at first to learn them.  Do something you enjoy. For example, listen to music or go to a movie. Practice your hobby or do volunteer  "work.  Meditate. This can help you relax, because you are not worrying about what happened before or what may happen in the future.  Do guided imagery. Imagine yourself in any setting that helps you feel calm. You can use online videos, books, or a teacher to guide you.  Do breathing exercises. For example:  From a standing position, bend forward from the waist with your knees slightly bent. Let your arms dangle close to the floor.  Breathe in slowly and deeply as you return to a standing position. Roll up slowly and lift your head last.  Hold your breath for just a few seconds in the standing position.  Breathe out slowly and bend forward from the waist.  Let your feelings out. Talk, laugh, cry, and express anger when you need to. Talking with supportive friends or family, a counselor, or a bennett leader about your feelings is a healthy way to relieve stress. Avoid discussing your feelings with people who make you feel worse.  Write. It may help to write about things that are bothering you. This helps you find out how much stress you feel and what is causing it. When you know this, you can find better ways to cope.  What can you do to prevent stress?  You might try some of these things to help prevent stress:  Manage your time. This helps you find time to do the things you want and need to do.  Get enough sleep. Your body recovers from the stresses of the day while you are sleeping.  Get support. Your family, friends, and community can make a difference in how you experience stress.  Limit your news feed. Avoid or limit time on social media or news that may make you feel stressed.  Do something active. Exercise or activity can help reduce stress. Walking is a great way to get started.  Where can you learn more?  Go to https://www.healthwise.net/patiented  Enter N032 in the search box to learn more about \"Learning About Stress.\"  Current as of: October 24, 2023               Content Version: 14.0    2743-1203 " "[employer].   Care instructions adapted under license by your healthcare professional. If you have questions about a medical condition or this instruction, always ask your healthcare professional. [employer] disclaims any warranty or liability for your use of this information.      9 Ways to Cut Back on Drinking  Maybe you've found yourself drinking more alcohol than you'd prefer. If you want to cut back, here are some ideas to try.    Think before you drink.  Do you really want a drink, or is it just a habit? If you're used to having a drink at a certain time, try doing something else then.     Look for substitutes.  Find some no-alcohol drinks that you enjoy, like flavored seltzer water, tea with honey, or tonic with a slice of lime. Or try alcohol-free beer or \"virgin\" cocktails (without the alcohol).     Drink more water.  Use water to quench your thirst. Drink a glass of water before you have any alcohol. Have another glass along with every drink or between drinks.     Shrink your drink.  For example, have a bottle of beer instead of a pint. Use a smaller glass for wine. Choose drinks with lower alcohol content (ABV%). Or use less liquor and more mixer in cocktails.     Slow down.  It's easy to drink quickly and without thinking about it. Pay attention, and make each drink last longer.     Do the math.  Total up how much you spend on alcohol each month. How much is that a year? If you cut back, what could you do with the money you save?     Take a break.  Choose a day or two each week when you won't drink at all. Notice how you feel on those days, physically and emotionally. How did you sleep? Do you feel better? Over time, add more break days.     Count calories.  Would you like to lose some weight? For some people that's a good motivator for cutting back. Figure out how many calories are in each drink. How many does that add up to in a day? In a week? In a month?     Practice " "saying no.  Be ready when someone offers you a drink. Try: \"Thanks, I've had enough.\" Or \"Thanks, but I'm cutting back.\" Or \"No, thanks. I feel better when I drink less.\"   Current as of: November 15, 2023  Content Version: 14.1    2695-1238 [employer].   Care instructions adapted under license by your healthcare professional. If you have questions about a medical condition or this instruction, always ask your healthcare professional. [employer] disclaims any warranty or liability for your use of this information.     "

## 2024-09-25 ENCOUNTER — HOSPITAL ENCOUNTER (OUTPATIENT)
Dept: MAMMOGRAPHY | Facility: CLINIC | Age: 44
Discharge: HOME | End: 2024-09-25
Payer: COMMERCIAL

## 2024-09-25 DIAGNOSIS — Z87.898 HISTORY OF LUMP OF RIGHT BREAST: ICD-10-CM

## 2024-09-25 DIAGNOSIS — Z12.31 VISIT FOR SCREENING MAMMOGRAM: ICD-10-CM

## 2024-09-25 PROCEDURE — 77063 BREAST TOMOSYNTHESIS BI: CPT

## 2024-10-01 ENCOUNTER — HOSPITAL ENCOUNTER (OUTPATIENT)
Dept: ULTRASOUND IMAGING | Facility: CLINIC | Age: 44
Discharge: HOME | End: 2024-10-01
Attending: FAMILY MEDICINE | Admitting: FAMILY MEDICINE
Payer: COMMERCIAL

## 2024-10-01 ENCOUNTER — MYC MEDICAL ADVICE (OUTPATIENT)
Dept: FAMILY MEDICINE | Facility: CLINIC | Age: 44
End: 2024-10-01
Payer: COMMERCIAL

## 2024-10-01 DIAGNOSIS — R92.8 ABNORMAL MAMMOGRAM: ICD-10-CM

## 2024-10-01 PROCEDURE — 76642 ULTRASOUND BREAST LIMITED: CPT | Mod: RT

## 2024-10-01 NOTE — TELEPHONE ENCOUNTER
Spoke with patient on the phone and she is aware of mammogram results as lump is considered benign and to self monitor for any changes and return in one year for mammogram.

## 2025-01-22 NOTE — PROGRESS NOTES
"NEW PATIENT OUTPATIENT VISIT     Chief complaint/Reason for visit: Irregular periods, menorrhagia, dysmenorrhea    HPI: 44 year old  presents to discuss above concerns.  She has had irregular periods for approximately 5 years now.  She had an ultrasound back in 3/2021 that showed a small intramural fibroid and an ovarian cyst but was otherwise normal.  She was supposed to follow up in 6-12 months but was unable to as she did not have medical insurance.  She is trying to catch up at this point.    Patient reports continued irregular periods.  When asked to clarify her cycle lengths, she reports she doesn't track them but a couple months ago she had a 20 day cycle that seemed very short to her.  Otherwise they tend to \"feel on time\" to her generally speaking.  Periods start with 3 days of light bleeding followed by very heavy flow x 3 days, then tapers off over another 2-3 days. On her heavy days she uses super tampons, changes hourly.  At night she uses pads while sleeping and puts a towel underneath to avoid overflow.  Passes clots up to large gumball size.  Endorses dysmenorrhea (painful cramping) that corresponds to heavy flow days.  Also reports a heavy feeling in groin area at various unpredictable times throughout the month that don't correspond with her period.  Has noticed more bloating, feels gassy all the time.  No intermenstrual bleeding.    Some night sweats but not consistent.    Smoker: 1/2ppd    No history of hormonal birth control use.    Last Pap 23 NIL, HPV negative--> repeat 5 years    ------------------------------------------------------------------------  Review of Systems:     With the exception of any items noted in HPI, the remainder of the GI and  ROS is negative.    ------------------------------------------------------------------------    The medical, surgical, social and family histories were reviewed and updated. " "    ------------------------------------------------------------------------  Physical Exam:    Constitutional:   - /75 (BP Location: Right arm, Patient Position: Sitting, Cuff Size: Adult Regular)   Pulse 100   Temp 97.7  F (36.5  C) (Tympanic)   Resp 18   Ht 1.594 m (5' 2.76\")   Wt 82.5 kg (181 lb 12.8 oz)   LMP 12/26/2024 (Approximate)   BMI 32.45 kg/m    - General Appearance: pleasant, well-appearing, NAD    ------------------------------------------------------------------------  Labs/Studies Reviewed:     US PELVIC COMPLETE WITH TRANSVAGINAL 3/18/2021 7:15 PM     HISTORY: Irregular periods.     TECHNIQUE: Transabdominal images of the pelvis are supplemented with endovaginal images to better define anatomy.     COMPARISON: None.     FINDINGS: The uterus is retroflexed and measures 9.2 x 4.9 x 6.8cm. There is an intramural fibroid in the fundal region that measures 1.7 cm. Endometrial stripe thickness is upper normal at 1.4 cm. There is a complex area in the right ovary that measures 2.5 cm. This is suspicious for a collapsed/resolving ovarian cyst. The left ovary is unremarkable. There is minimal free pelvic fluid.                                                                      IMPRESSION: No specific reason for the patient's irregular menses is demonstrated.    ------------------------------------------------------------------------  Assessment/Plan:  44 year old with menorrhagia with irregular cycle, dysmenorrhea, history of uterine fibroid and ovarian cyst here for follow up.  Smoker.  -- Check labs: recent TSH was normal, will check PRL, FSH, E2, CBC, ferritin (as patient has history of anemia, does take iron).  -- Advised repeating pelvic US to evaluate for structural causes for abnormal bleeding.  Discussed potential need for hysteroscopy if structural cause found such as fibroid/polyp that would require removal.  -- Discussed if above evaluation is normal this could simply represent " abnormal perimenopausal bleeding.  Would recommend patient return at that point for EmBx to rule out hyperplasia/cancer.  -- Discussed management options if no structural cause found.  Patient not a candidate for estrogen given she is a smoker >age 36y but discussed progesterone options, including Mirena IUD as a good way to manage her bleeding.  Patient asked about endometrial ablation-- discussed in detail, including pros/cons.  She is leaning toward Mirena, pamphlet given.  Discussed could do EmBx with Mirena insertion at same visit.    Will make recommendation for follow up once results of pelvic US return.      Jennifer King MD

## 2025-01-23 ENCOUNTER — OFFICE VISIT (OUTPATIENT)
Dept: OBGYN | Facility: CLINIC | Age: 45
End: 2025-01-23
Payer: COMMERCIAL

## 2025-01-23 VITALS
HEART RATE: 100 BPM | BODY MASS INDEX: 32.21 KG/M2 | DIASTOLIC BLOOD PRESSURE: 75 MMHG | SYSTOLIC BLOOD PRESSURE: 131 MMHG | TEMPERATURE: 97.7 F | RESPIRATION RATE: 18 BRPM | HEIGHT: 63 IN | WEIGHT: 181.8 LBS

## 2025-01-23 DIAGNOSIS — F17.200 SMOKER: ICD-10-CM

## 2025-01-23 DIAGNOSIS — D25.1 INTRAMURAL LEIOMYOMA OF UTERUS: ICD-10-CM

## 2025-01-23 DIAGNOSIS — N92.1 MENORRHAGIA WITH IRREGULAR CYCLE: ICD-10-CM

## 2025-01-23 DIAGNOSIS — N92.6 IRREGULAR PERIODS: Primary | ICD-10-CM

## 2025-01-23 DIAGNOSIS — N94.6 DYSMENORRHEA: ICD-10-CM

## 2025-01-23 LAB
ERYTHROCYTE [DISTWIDTH] IN BLOOD BY AUTOMATED COUNT: 15.9 % (ref 10–15)
ESTRADIOL SERPL-MCNC: 106 PG/ML
FERRITIN SERPL-MCNC: 8 NG/ML (ref 6–175)
FSH SERPL IRP2-ACNC: 4.5 MIU/ML
HCT VFR BLD AUTO: 37.6 % (ref 35–47)
HGB BLD-MCNC: 11.5 G/DL (ref 11.7–15.7)
MCH RBC QN AUTO: 23 PG (ref 26.5–33)
MCHC RBC AUTO-ENTMCNC: 30.6 G/DL (ref 31.5–36.5)
MCV RBC AUTO: 75 FL (ref 78–100)
PLATELET # BLD AUTO: 272 10E3/UL (ref 150–450)
PROLACTIN SERPL 3RD IS-MCNC: 16 NG/ML (ref 5–23)
RBC # BLD AUTO: 4.99 10E6/UL (ref 3.8–5.2)
WBC # BLD AUTO: 9 10E3/UL (ref 4–11)

## 2025-01-23 NOTE — NURSING NOTE
"Initial /75 (BP Location: Right arm, Patient Position: Sitting, Cuff Size: Adult Regular)   Pulse 100   Temp 97.7  F (36.5  C) (Tympanic)   Resp 18   Ht 1.594 m (5' 2.76\")   Wt 82.5 kg (181 lb 12.8 oz)   LMP 12/26/2024 (Approximate)   BMI 32.45 kg/m   Estimated body mass index is 32.45 kg/m  as calculated from the following:    Height as of this encounter: 1.594 m (5' 2.76\").    Weight as of this encounter: 82.5 kg (181 lb 12.8 oz). .    "

## 2025-01-30 ENCOUNTER — HOSPITAL ENCOUNTER (OUTPATIENT)
Dept: ULTRASOUND IMAGING | Facility: CLINIC | Age: 45
Discharge: HOME | End: 2025-01-30
Attending: OBSTETRICS & GYNECOLOGY
Payer: COMMERCIAL

## 2025-01-30 DIAGNOSIS — N92.6 IRREGULAR PERIODS: ICD-10-CM

## 2025-01-30 DIAGNOSIS — D25.1 INTRAMURAL LEIOMYOMA OF UTERUS: ICD-10-CM

## 2025-01-30 DIAGNOSIS — N94.6 DYSMENORRHEA: ICD-10-CM

## 2025-01-30 PROCEDURE — 76856 US EXAM PELVIC COMPLETE: CPT

## 2025-02-05 NOTE — PROGRESS NOTES
"ESTABLISHED PATIENT OUTPATIENT VISIT    Chief complaint/Reason for visit: Follow up    HPI: 43yo presents for follow up visit as part of evaluation of irregular periods, menorrhagia, dysmenorrhea.  Patient was seen on 25 with the following history obtained:    44 year old  has had irregular periods for approximately 5 years now.  She had an ultrasound back in 3/2021 that showed a small intramural fibroid and an ovarian cyst but was otherwise normal.  She was supposed to follow up in 6-12 months but was unable to as she did not have medical insurance.  She is trying to catch up at this point.     Patient reports continued irregular periods.  When asked to clarify her cycle lengths, she reports she doesn't track them but a couple months ago she had a 20 day cycle that seemed very short to her.  Otherwise they tend to \"feel on time\" to her generally speaking.  Periods start with 3 days of light bleeding followed by very heavy flow x 3 days, then tapers off over another 2-3 days. On her heavy days she uses super tampons, changes hourly.  At night she uses pads while sleeping and puts a towel underneath to avoid overflow.  Passes clots up to large gumball size.  Endorses dysmenorrhea (painful cramping) that corresponds to heavy flow days.  Also reports a heavy feeling in groin area at various unpredictable times throughout the month that don't correspond with her period.  Has noticed more bloating, feels gassy all the time.  No intermenstrual bleeding.     Some night sweats but not consistent.     Smoker: 1/2ppd     No history of hormonal birth control use.     Last Pap 23 NIL, HPV negative--> repeat 5 years     Interval History: Labs and pelvic US were ordered which patient has completed.  She has returned for follow up discussion.  No new concerns.    ------------------------------------------------------------------------  Review of Systems:     With the exception of any items noted in HPI, the " "remainder of the GI and  ROS is negative.    ------------------------------------------------------------------------    The medical, surgical, social and family histories were reviewed and updated.     ------------------------------------------------------------------------  Physical Exam:    Constitutional:   - BP (!) 143/69 (BP Location: Right arm, Patient Position: Sitting, Cuff Size: Adult Regular)   Pulse 94   Temp 97.4  F (36.3  C)   Ht 1.594 m (5' 2.75\")   Wt 82.9 kg (182 lb 11.2 oz)   LMP 01/26/2025 (Approximate)   BMI 32.62 kg/m    - General Appearance: pleasant, well-appearing, NAD    ------------------------------------------------------------------------  Labs/Studies Reviewed:     EXAM: US PELVIC TRANSABDOMINAL AND TRANSVAGINAL  LOCATION: [facility]  DATE: 1/30/2025     INDICATION: Irregular periods, history of fibroid, ovarian cysts.  COMPARISON: None.  TECHNIQUE: Transabdominal scans were performed. Endovaginal ultrasound was performed to better visualize the adnexa.     FINDINGS:     UTERUS: 11 x 6.9 x 5.8 cm. There is a intramural fibroids seen within the uterine fundus measuring 2.5 x 2.3 x 1.7 cm. The remainder of the uterus is sonographically unremarkable.     ENDOMETRIUM: 6.6 mm. Normal smooth endometrium.     RIGHT OVARY: 2.9 x 3.1 x 1 cm. Normal.     LEFT OVARY: 2.0 x 3.1 x 1.8 cm. Normal.     No significant free fluid.                                                                      IMPRESSION:  1.  Intramural uterine fundal fibroid measuring 2.5 cm. Otherwise sonographically unremarkable uterus and ovaries.    Component      Latest Ref Rng 1/23/2025  12:12 PM   WBC      4.0 - 11.0 10e3/uL 9.0    RBC Count      3.80 - 5.20 10e6/uL 4.99    Hemoglobin      11.7 - 15.7 g/dL 11.5 (L)    Hematocrit      35.0 - 47.0 % 37.6    MCV      78 - 100 fL 75 (L)    MCH      26.5 - 33.0 pg 23.0 (L)    MCHC      31.5 - 36.5 g/dL 30.6 (L)    RDW      10.0 - 15.0 % 15.9 " (H)    Platelet Count      150 - 450 10e3/uL 272    Ferritin      6 - 175 ng/mL 8    FSH      mIU/mL 4.5    Estradiol      pg/mL 106    Prolactin      5 - 23 ng/mL 16       Legend:  (L) Low  (H) High    ------------------------------------------------------------------------  Procedure: ENDOMETRIAL BIOPSY & MIRENA IUD INSERTION    For EmBx: Rationale for procedure was discussed with patient.  Risks and benefits of procedure were also discussed and informed consent was obtained.    For IUD insertion: Patient has been counseled regarding the risks, benefits and alternatives to IUD use and her questions have been answered.  She was counseled regarding the risks of expulsion, uterine perforation, migration, abnormal bleeding, and failure rates.  She wishes to proceed with the insertion of the Mirena IUD.    Pregnancy has been ruled out by the following: negative UPT.    Procedure:     The patient was placed in the dorsal lithotomy position. Bimanual exam revealed a retroflexed uterus.    A speculum was inserted into the vagina.  The cervical os was visualized and prepped with betadine.      An Allis clamp was placed on the anterior lip of the cervix for stabilization.  A sterile flexible Pipelle biopsy catheter was inserted into the uterine cavity.  The uterus sounded to 10cm.  Three passes were obtained using rotating and oscillating action with suction.  A moderate amount of tissue was obtained.    Next, the Mirena IUD was inserted according to the 's directions without difficulty. The IUD strings were trimmed to a length of 2cm. The patient tolerated the procedure well.  There were no complications.    ------------------------------------------------------------------------  Assessment/Plan:  44 year old with menorrhagia with irregular cycle, dysmenorrhea, and anemia.  -- Reviewed pelvic US: fibroid present and slightly increased in size compared to last US but still appears intramural and therefore  likely noncontributory to symptoms.  -- Labs remarkable for iron deficiency anemia.  Patient reports she is taking iron but never able to get on top of this.  Discussed that her heavy periods are likely the issue.  -- Advised EmBx to rule out hyperplasia/atypia/cancer.  Discussed Mirena IUD as an excellent treatment option, could insert today if patient desires.  Reviewed mechanism of action, potential benefits and side effects.  Patient wishes to proceed with both EmBx and Mirena IUD insertion today.  -- Successful endometrial biopsy for menorrhagia with irregular cycle.  The patient was advised to call for any fever or for prolonged or severe pain or bleeding. She was advised to avoid vaginal intercourse for 48 hours or until the bleeding has completely stopped. She will be notified of pathology when available.  -- Successful Mirena IUD insertion  1) Mirena IUD lot number [id number] was inserted today.   2) Post-placement instructions reviewed with patient.  3) The IUD should be removed in 8 years and may be removed at any time at her request.     Jennifer King MD

## 2025-02-06 ENCOUNTER — OFFICE VISIT (OUTPATIENT)
Dept: OBGYN | Facility: CLINIC | Age: 45
End: 2025-02-06
Payer: COMMERCIAL

## 2025-02-06 VITALS
HEIGHT: 63 IN | WEIGHT: 182.7 LBS | TEMPERATURE: 97.4 F | BODY MASS INDEX: 32.37 KG/M2 | SYSTOLIC BLOOD PRESSURE: 143 MMHG | HEART RATE: 94 BPM | DIASTOLIC BLOOD PRESSURE: 69 MMHG

## 2025-02-06 DIAGNOSIS — N94.6 DYSMENORRHEA: ICD-10-CM

## 2025-02-06 DIAGNOSIS — Z30.430 ENCOUNTER FOR INSERTION OF INTRAUTERINE CONTRACEPTIVE DEVICE: ICD-10-CM

## 2025-02-06 DIAGNOSIS — N92.1 MENORRHAGIA WITH IRREGULAR CYCLE: Primary | ICD-10-CM

## 2025-02-06 DIAGNOSIS — Z30.430 ENCOUNTER FOR INSERTION OF MIRENA IUD: ICD-10-CM

## 2025-02-06 DIAGNOSIS — D25.1 INTRAMURAL LEIOMYOMA OF UTERUS: ICD-10-CM

## 2025-02-06 LAB
HCG UR QL: NEGATIVE
INTERNAL QC OK POCT: NORMAL
POCT KIT EXPIRATION DATE: NORMAL
POCT KIT LOT NUMBER: NORMAL

## 2025-02-10 LAB
PATH REPORT.COMMENTS IMP SPEC: NORMAL
PATH REPORT.COMMENTS IMP SPEC: NORMAL
PATH REPORT.FINAL DX SPEC: NORMAL
PATH REPORT.GROSS SPEC: NORMAL
PATH REPORT.MICROSCOPIC SPEC OTHER STN: NORMAL
PATH REPORT.RELEVANT HX SPEC: NORMAL
PHOTO IMAGE: NORMAL

## 2025-07-08 ENCOUNTER — HOSPITAL ENCOUNTER (EMERGENCY)
Facility: CLINIC | Age: 45
Discharge: HOME | End: 2025-07-08
Attending: STUDENT IN AN ORGANIZED HEALTH CARE EDUCATION/TRAINING PROGRAM
Payer: COMMERCIAL

## 2025-07-08 VITALS
DIASTOLIC BLOOD PRESSURE: 99 MMHG | HEART RATE: 120 BPM | TEMPERATURE: 98 F | SYSTOLIC BLOOD PRESSURE: 132 MMHG | OXYGEN SATURATION: 94 % | RESPIRATION RATE: 18 BRPM

## 2025-07-08 DIAGNOSIS — H57.02 ANISOCORIA: ICD-10-CM

## 2025-07-08 PROCEDURE — 99283 EMERGENCY DEPT VISIT LOW MDM: CPT | Performed by: STUDENT IN AN ORGANIZED HEALTH CARE EDUCATION/TRAINING PROGRAM

## 2025-07-08 ASSESSMENT — COLUMBIA-SUICIDE SEVERITY RATING SCALE - C-SSRS
2. HAVE YOU ACTUALLY HAD ANY THOUGHTS OF KILLING YOURSELF IN THE PAST MONTH?: NO
6. HAVE YOU EVER DONE ANYTHING, STARTED TO DO ANYTHING, OR PREPARED TO DO ANYTHING TO END YOUR LIFE?: NO
1. IN THE PAST MONTH, HAVE YOU WISHED YOU WERE DEAD OR WISHED YOU COULD GO TO SLEEP AND NOT WAKE UP?: NO

## 2025-07-08 ASSESSMENT — VISUAL ACUITY: OU: 1

## 2025-07-08 NOTE — ED PROVIDER NOTES
History     Chief Complaint   Patient presents with    Eye Problem     HPI  Candelario Dumont is a 44 year old female who has anxiety, GERD, who presents to the ER with concern for unequal pupils.  Patient states that she went to use the bathroom and looked in the mirror and noticed that her left pupil appeared larger than her right.  She never noticed this before and it concerned her so she came in for evaluation.  She denies any falls or trauma.  She denies using any eyedrops.  She is not any medications.  She does endorse drinking 6 beers tonight.  She is never noticing back this before.  No vision deficits.  No double vision no weakness in the arms or legs.  No trouble walking.  No headache.  No numbness or tingling.  No fevers.    Allergies:  No Known Allergies    Problem List:    Patient Active Problem List    Diagnosis Date Noted    Encounter for insertion of Mirena IUD 02/06/2025     Priority: Medium     Mirena IUD insertion  NDC: 81606-147-33  Lot:[id number]  EXP:2/2027      Routine general medical examination at a health care facility 09/24/2024     Priority: Medium    Screening for diabetes mellitus 09/24/2024     Priority: Medium    Screening for hyperlipidemia 09/24/2024     Priority: Medium    Screening for deficiency anemia 09/24/2024     Priority: Medium    Screening for thyroid disorder 09/24/2024     Priority: Medium    History of lump of right breast 09/03/2024     Priority: Medium    Visit for screening mammogram 09/03/2024     Priority: Medium    Mild episode of recurrent major depressive disorder 05/21/2019     Priority: Medium    Gastroesophageal reflux disease without esophagitis 05/21/2019     Priority: Medium    Anxiety 05/31/2017     Priority: Medium    Tobacco use disorder 05/10/2010     Priority: Medium    Seasonal allergies 05/10/2010     Priority: Medium        Past Medical History:    No past medical history on file.    Past Surgical History:    No past surgical history on  file.    Family History:    Family History   Problem Relation Age of Onset    Heart Disease Paternal Grandfather         Heart Disease    Diabetes Maternal Aunt         Diabetes    Breast Cancer Maternal Aunt     Breast Cancer Maternal Aunt     Diabetes Maternal Uncle         Diabetes    Melanoma No family hx of        Social History:  Marital Status:   [2]  Social History     Tobacco Use    Smoking status: Every Day     Current packs/day: 0.50     Average packs/day: 0.5 packs/day for 13.0 years (6.5 ttl pk-yrs)     Types: Cigarettes    Smokeless tobacco: Never    Tobacco comments:     Smoking History Packs/day: 0.50   Vaping Use    Vaping status: Never Used   Substance Use Topics    Alcohol use: Yes    Drug use: Unknown     Types: Other     Comment: Drug use: No        Medications:    hydrOXYzine HCl (ATARAX) 25 MG tablet  levonorgestrel (MIRENA) 52 MG (20 mcg/day) IUD  omeprazole (PRILOSEC) 20 MG DR capsule          Review of Systems  See HPI  Physical Exam   BP: (!) 132/99  Pulse: 120  Temp: 98  F (36.7  C)  Resp: 18  SpO2: 94 %      Physical Exam  Constitutional:       General: She is not in acute distress.     Appearance: Normal appearance. She is not toxic-appearing.   HENT:      Head: Normocephalic and atraumatic.      Right Ear: Hearing and external ear normal.      Left Ear: Hearing and external ear normal.      Nose: Nose normal.      Mouth/Throat:      Lips: Pink.      Mouth: Mucous membranes are moist.      Pharynx: Oropharynx is clear.   Eyes:      General: Lids are normal. Lids are everted, no foreign bodies appreciated. Vision grossly intact.      Extraocular Movements: Extraocular movements intact.      Conjunctiva/sclera: Conjunctivae normal.      Pupils: Pupils are unequal.      Comments: Her left pupil appears larger than the right pupil.  The anisocoria appeared larger in the light.  Pupils are round.  There is no eyelid drooping bilaterally   Cardiovascular:      Rate and Rhythm: Normal  rate.      Pulses: Normal pulses.   Pulmonary:      Effort: Pulmonary effort is normal.   Musculoskeletal:      Cervical back: Normal range of motion.   Skin:     General: Skin is warm and dry.      Capillary Refill: Capillary refill takes less than 2 seconds.   Neurological:      Mental Status: She is alert.      Cranial Nerves: No cranial nerve deficit.      Sensory: No sensory deficit.      Motor: No weakness.      Coordination: Coordination normal.      Gait: Gait normal.         ED Course        Procedures             Critical Care time:  none             No results found for this or any previous visit (from the past 24 hours).    Medications - No data to display    Assessments & Plan (with Medical Decision Making)     I have reviewed the nursing notes.    I have reviewed the findings, diagnosis, plan and need for follow up with the patient.          Medical Decision Making  Candelario Dumont is a 44 year old female who has anxiety, GERD, who presents to the ER with concern for unequal pupils.  Vitals are reviewed notable for mild hypertension otherwise reassuring.  History exam as above.  Patient has a completely normal neurological exam other than the anisocoria.  Both of the pupils are working appropriately in the light and dark conditions.  Extraocular movements are intact.  The anisocoria does seem more pronounced in the light which would suggest that the left, larger pupil, is the abnormal 1.  There is no evidence of Mani syndrome, no eyelid drooping.  No other neurological symptoms to suggest CVA.  She does not have diplopia or headache to suggest space-occupying lesion.  I do not think imaging is needed here in the ER.  I am not exactly sure what is causing her symptoms.  She denied any meds or eyedrops.  Likely physiological given that there is no other symptoms, but I am going to have her follow-up with her eye doctor and neurology.  She is can follow-up with her eye doctor tomorrow and I referred  her to follow-up with neurology.  Additional reassurance anticipatory guidance discussed.  She is instructed to return if she develops any other neurological deficits or other concerning symptoms.        Discharge Medication List as of 7/8/2025  4:56 AM          Final diagnoses:   Anisocoria       7/8/2025   [facility] EMERGENCY DEPT       Dmitry Reis MD  07/08/25 0515

## 2025-07-08 NOTE — DISCHARGE INSTRUCTIONS
I am not exactly sure what is causing your unequal pupils.  Fortunately, you do not have any concerning associated symptoms.  It is most likely physiological and is nothing dangerous and will likely go away on its own.  However, you should follow-up with your eye doctor and neurology to ensure nothing else needs to be done.  Go to your eye doctor tomorrow and I referred you to neurology.  Return to the ER if you develop any severe or concerning symptoms

## 2025-07-08 NOTE — ED TRIAGE NOTES
Using the bathroom and noticed R pupil was bigger then other. Of note has been drinking tonight and last drink was hour PTA.

## 2025-07-09 ENCOUNTER — PATIENT OUTREACH (OUTPATIENT)
Dept: CARE COORDINATION | Facility: CLINIC | Age: 45
End: 2025-07-09
Payer: COMMERCIAL

## 2025-08-25 ENCOUNTER — PATIENT OUTREACH (OUTPATIENT)
Dept: CARE COORDINATION | Facility: CLINIC | Age: 45
End: 2025-08-25
Payer: COMMERCIAL